# Patient Record
Sex: FEMALE | Race: WHITE | NOT HISPANIC OR LATINO | Employment: UNEMPLOYED | URBAN - METROPOLITAN AREA
[De-identification: names, ages, dates, MRNs, and addresses within clinical notes are randomized per-mention and may not be internally consistent; named-entity substitution may affect disease eponyms.]

---
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
## 2024-04-04 ENCOUNTER — TELEPHONE (OUTPATIENT)
Dept: OBSTETRICS AND GYNECOLOGY | Facility: CLINIC | Age: 19
End: 2024-04-04
Payer: COMMERCIAL

## 2024-04-04 DIAGNOSIS — Z32.00 PREGNANCY EXAMINATION OR TEST, PREGNANCY UNCONFIRMED: Primary | ICD-10-CM

## 2024-04-04 NOTE — TELEPHONE ENCOUNTER
Patient called in on 03/20 with a positive at home pregnancy test. A referral was made but patient has not yet been contacted. LMP was 01-13. I have attached an order for an HCG to be done. Please sign.

## 2024-04-05 ENCOUNTER — LAB (OUTPATIENT)
Dept: OBSTETRICS AND GYNECOLOGY | Facility: CLINIC | Age: 19
End: 2024-04-05
Payer: COMMERCIAL

## 2024-04-05 DIAGNOSIS — Z32.00 PREGNANCY EXAMINATION OR TEST, PREGNANCY UNCONFIRMED: ICD-10-CM

## 2024-04-05 LAB — HCG INTACT+B SERPL-ACNC: NORMAL MIU/ML

## 2024-04-05 PROCEDURE — 84702 CHORIONIC GONADOTROPIN TEST: CPT | Performed by: NURSE PRACTITIONER

## 2024-04-08 ENCOUNTER — TELEPHONE (OUTPATIENT)
Dept: OBSTETRICS AND GYNECOLOGY | Facility: CLINIC | Age: 19
End: 2024-04-08
Payer: COMMERCIAL

## 2024-04-08 ENCOUNTER — TELEPHONE (OUTPATIENT)
Dept: OBSTETRICS AND GYNECOLOGY | Facility: CLINIC | Age: 19
End: 2024-04-08

## 2024-04-08 DIAGNOSIS — O36.80X0 PREGNANCY WITH INCONCLUSIVE FETAL VIABILITY, SINGLE OR UNSPECIFIED FETUS: Primary | ICD-10-CM

## 2024-04-08 NOTE — TELEPHONE ENCOUNTER
Beta HCG is consistent with at least 6-7 weeks gestation.  Please confirm LMP.  Order for ultrasound signed.  Recommend new OB visit in 2-3 weeks, she will need to be between 9-10 weeks to have MaterniT 21 drawn.

## 2024-04-08 NOTE — TELEPHONE ENCOUNTER
Patient called requesting results from HCG on Friday. I have pended an ultrasound order for signature. She is also wanting to know if labs can be scheduled for the Maternit 21 testing. Please advise.

## 2024-04-09 ENCOUNTER — TELEPHONE (OUTPATIENT)
Dept: OBSTETRICS AND GYNECOLOGY | Facility: CLINIC | Age: 19
End: 2024-04-09
Payer: COMMERCIAL

## 2024-04-09 NOTE — TELEPHONE ENCOUNTER
----- Message from SHANE Prescott sent at 4/9/2024  1:10 PM EDT -----  Please let patient know her ultrasound shows a living pregnancy which measures 12w3d, confirming her CLARIBEL of 10/19/24 by her LMP.   Heart rate was 155.  Recommend new OB appointment in 1-2 weeks.

## 2024-04-25 ENCOUNTER — INITIAL PRENATAL (OUTPATIENT)
Dept: OBSTETRICS AND GYNECOLOGY | Facility: CLINIC | Age: 19
End: 2024-04-25
Payer: COMMERCIAL

## 2024-04-25 VITALS — BODY MASS INDEX: 30.12 KG/M2 | SYSTOLIC BLOOD PRESSURE: 117 MMHG | DIASTOLIC BLOOD PRESSURE: 69 MMHG | WEIGHT: 181 LBS

## 2024-04-25 DIAGNOSIS — Z34.91 ENCOUNTER FOR SUPERVISION OF NORMAL PREGNANCY IN FIRST TRIMESTER, UNSPECIFIED GRAVIDITY: Primary | ICD-10-CM

## 2024-04-25 DIAGNOSIS — Z34.00 SUPERVISION OF NORMAL FIRST PREGNANCY, ANTEPARTUM: ICD-10-CM

## 2024-04-25 LAB
ABO GROUP BLD: NORMAL
AMPHET+METHAMPHET UR QL: NEGATIVE
BARBITURATES UR QL SCN: NEGATIVE
BASOPHILS # BLD AUTO: 0.02 10*3/MM3 (ref 0–0.2)
BASOPHILS NFR BLD AUTO: 0.2 % (ref 0–1.5)
BENZODIAZ UR QL SCN: NEGATIVE
BLD GP AB SCN SERPL QL: NEGATIVE
C TRACH RRNA CVX QL NAA+PROBE: DETECTED
CANNABINOIDS SERPL QL: POSITIVE
COCAINE UR QL: NEGATIVE
DEPRECATED RDW RBC AUTO: 53 FL (ref 37–54)
EOSINOPHIL # BLD AUTO: 0.04 10*3/MM3 (ref 0–0.4)
EOSINOPHIL NFR BLD AUTO: 0.5 % (ref 0.3–6.2)
ERYTHROCYTE [DISTWIDTH] IN BLOOD BY AUTOMATED COUNT: 18.3 % (ref 12.3–15.4)
FENTANYL UR-MCNC: NEGATIVE NG/ML
GLUCOSE UR STRIP-MCNC: NEGATIVE MG/DL
HBV SURFACE AG SERPL QL IA: NORMAL
HCT VFR BLD AUTO: 31.6 % (ref 34–46.6)
HCV AB SER QL: NORMAL
HGB BLD-MCNC: 10.2 G/DL (ref 12–15.9)
HIV 1+2 AB+HIV1 P24 AG SERPL QL IA: NORMAL
IMM GRANULOCYTES # BLD AUTO: 0.01 10*3/MM3 (ref 0–0.05)
IMM GRANULOCYTES NFR BLD AUTO: 0.1 % (ref 0–0.5)
LYMPHOCYTES # BLD AUTO: 2.83 10*3/MM3 (ref 0.7–3.1)
LYMPHOCYTES NFR BLD AUTO: 34.6 % (ref 19.6–45.3)
MCH RBC QN AUTO: 25.8 PG (ref 26.6–33)
MCHC RBC AUTO-ENTMCNC: 32.3 G/DL (ref 31.5–35.7)
MCV RBC AUTO: 80 FL (ref 79–97)
METHADONE UR QL SCN: NEGATIVE
MONOCYTES # BLD AUTO: 0.45 10*3/MM3 (ref 0.1–0.9)
MONOCYTES NFR BLD AUTO: 5.5 % (ref 5–12)
N GONORRHOEA RRNA SPEC QL NAA+PROBE: NOT DETECTED
NEUTROPHILS NFR BLD AUTO: 4.84 10*3/MM3 (ref 1.7–7)
NEUTROPHILS NFR BLD AUTO: 59.1 % (ref 42.7–76)
NRBC BLD AUTO-RTO: 0 /100 WBC (ref 0–0.2)
OPIATES UR QL: NEGATIVE
OXYCODONE UR QL SCN: NEGATIVE
PLATELET # BLD AUTO: 214 10*3/MM3 (ref 140–450)
PMV BLD AUTO: 9.8 FL (ref 6–12)
PROT UR STRIP-MCNC: NEGATIVE MG/DL
RBC # BLD AUTO: 3.95 10*6/MM3 (ref 3.77–5.28)
RH BLD: NEGATIVE
T PALLIDUM IGG SER QL: NORMAL
WBC NRBC COR # BLD AUTO: 8.19 10*3/MM3 (ref 3.4–10.8)

## 2024-04-25 PROCEDURE — 86803 HEPATITIS C AB TEST: CPT | Performed by: OBSTETRICS & GYNECOLOGY

## 2024-04-25 PROCEDURE — 80307 DRUG TEST PRSMV CHEM ANLYZR: CPT | Performed by: OBSTETRICS & GYNECOLOGY

## 2024-04-25 PROCEDURE — 86850 RBC ANTIBODY SCREEN: CPT | Performed by: OBSTETRICS & GYNECOLOGY

## 2024-04-25 PROCEDURE — 86901 BLOOD TYPING SEROLOGIC RH(D): CPT | Performed by: OBSTETRICS & GYNECOLOGY

## 2024-04-25 PROCEDURE — 86900 BLOOD TYPING SEROLOGIC ABO: CPT | Performed by: OBSTETRICS & GYNECOLOGY

## 2024-04-25 PROCEDURE — 87086 URINE CULTURE/COLONY COUNT: CPT | Performed by: OBSTETRICS & GYNECOLOGY

## 2024-04-25 PROCEDURE — G0432 EIA HIV-1/HIV-2 SCREEN: HCPCS | Performed by: OBSTETRICS & GYNECOLOGY

## 2024-04-25 PROCEDURE — 86780 TREPONEMA PALLIDUM: CPT | Performed by: OBSTETRICS & GYNECOLOGY

## 2024-04-25 PROCEDURE — 87340 HEPATITIS B SURFACE AG IA: CPT | Performed by: OBSTETRICS & GYNECOLOGY

## 2024-04-25 PROCEDURE — 87591 N.GONORRHOEAE DNA AMP PROB: CPT | Performed by: OBSTETRICS & GYNECOLOGY

## 2024-04-25 PROCEDURE — 86762 RUBELLA ANTIBODY: CPT | Performed by: OBSTETRICS & GYNECOLOGY

## 2024-04-25 PROCEDURE — 87491 CHLMYD TRACH DNA AMP PROBE: CPT | Performed by: OBSTETRICS & GYNECOLOGY

## 2024-04-25 PROCEDURE — 85025 COMPLETE CBC W/AUTO DIFF WBC: CPT | Performed by: OBSTETRICS & GYNECOLOGY

## 2024-04-26 RX ORDER — AZITHROMYCIN 500 MG/1
TABLET, FILM COATED ORAL
Qty: 4 TABLET | Refills: 0 | Status: SHIPPED | OUTPATIENT
Start: 2024-04-26

## 2024-04-26 NOTE — PROGRESS NOTES
OB Initial Visit    CC- Here for care of current pregnancy     CLARIBEL Finalized : Due date finalized: 10/19/24    Subjective:  19 y.o.  presenting for her first obstetrical visit.    LMP: Patient's last menstrual period was 2024 (exact date). sure    Pt has no complaints, is doing well    20 y/o  here for new OB . LMP 24. Menses q mo x 7 days with 3-4 days heavy.  x 1 8lb. Has quit vaping.    Reviewed and updated:  OBHx, GYNHx (STDs), PMHx, Medications, Allergies, PSHx, Social Hx, Preventative Hx (PAP), Hx of abuse/safe environment, Vaccine Hx including hx of chickenpox or vaccine, Genetic Hx (pt, FOB, both families).        Objective:  /69   Wt 82.1 kg (181 lb)   LMP 2024 (Exact Date) Comment: PT STATES SHE IS PREGNANT PER HOMETEST  BMI 30.12 kg/m²  Chaperone Present  General- NAD, alert and oriented, appropriate  Psych- Normal mood, good memory  Neck- No masses, no thyroid enlargement  CV- Regular rhythm, no murnurs  Resp- CTA to bases, no wheezes  Abdomen- Soft, non distended, non tender, no masses     Breast left- No mass, non tender, no nipple discharge  Breast right- No mass, non tender, no nipple discharge    External genitalia- Normal, no lesions  Urethra- Normal, no masses, non tender  Vagina- Normal, no discharge  Bladder- Normal, no masses, non tender  Cvx- Normal, no lesions, no discharge, no CMT  Uterus- Consistent with dates  Adnexa- Normal, no mass, non tender    Lymphatic- No palpable neck, axillary, or groin nodes  Ext- No edema, no cyanosis    Skin- No lesions, no rashes, no acanthosis nigricans      Assessment and Plan:  Diagnoses and all orders for this visit:    1. Encounter for supervision of normal pregnancy in first trimester, unspecified  (Primary)  -     [id number] PLUS Core+SCA+ESS - Blood,    2. Supervision of normal first pregnancy, antepartum  -     OB Panel With HIV  -     Chlamydia trachomatis, Neisseria gonorrhoeae, PCR - Swab, Cervix  -      POC Urinalysis Dipstick  -     Urine Culture - Urine, Urine, Clean Catch  -     Urine Drug Screen - Urine, Clean Catch            14w6d    Genetic Screening: NIPS    Vaccines: s/p FLU vaccine this season  s/p COVID vaccine    Counseling: Nutrition discussed, calories, activity/exercise in pregnancy  Discussed dietary restrictions/safety food preparation in pregnancy  Reviewed what to expect prenatal visits, office providers (female and male) and covering [facility]/Dr. Purdy  Appropriate trimester precautions provided, N/V, vag bleeding, cramping  Questions answered    Labs: Prenatal labs, cultures, and PAP performed (prn), maternity 21    No follow-ups on file.      Jackie Hauser,   04/25/2024

## 2024-04-27 LAB
BACTERIA SPEC AEROBE CULT: NO GROWTH
RUBV IGG SERPL IA-ACNC: 1.94 INDEX

## 2024-05-15 ENCOUNTER — REFERRAL TRIAGE (OUTPATIENT)
Dept: LABOR AND DELIVERY | Facility: HOSPITAL | Age: 19
End: 2024-05-15
Payer: COMMERCIAL

## 2024-05-22 ENCOUNTER — PATIENT OUTREACH (OUTPATIENT)
Dept: LABOR AND DELIVERY | Facility: HOSPITAL | Age: 19
End: 2024-05-22
Payer: COMMERCIAL

## 2024-05-22 ENCOUNTER — ROUTINE PRENATAL (OUTPATIENT)
Dept: OBSTETRICS AND GYNECOLOGY | Facility: CLINIC | Age: 19
End: 2024-05-22
Payer: COMMERCIAL

## 2024-05-22 VITALS — BODY MASS INDEX: 29.79 KG/M2 | DIASTOLIC BLOOD PRESSURE: 71 MMHG | WEIGHT: 179 LBS | SYSTOLIC BLOOD PRESSURE: 122 MMHG

## 2024-05-22 DIAGNOSIS — Z34.00 SUPERVISION OF NORMAL FIRST PREGNANCY, ANTEPARTUM: Primary | ICD-10-CM

## 2024-05-22 DIAGNOSIS — O26.893 RH NEGATIVE STATUS DURING PREGNANCY IN THIRD TRIMESTER: ICD-10-CM

## 2024-05-22 DIAGNOSIS — Z67.91 RH NEGATIVE STATUS DURING PREGNANCY IN THIRD TRIMESTER: ICD-10-CM

## 2024-05-22 DIAGNOSIS — Z28.39 RUBELLA NON-IMMUNE STATUS, ANTEPARTUM: ICD-10-CM

## 2024-05-22 DIAGNOSIS — O09.899 RUBELLA NON-IMMUNE STATUS, ANTEPARTUM: ICD-10-CM

## 2024-05-22 DIAGNOSIS — Z34.80 SUPERVISION OF OTHER NORMAL PREGNANCY, ANTEPARTUM: ICD-10-CM

## 2024-05-22 DIAGNOSIS — O99.519 ASTHMA AFFECTING PREGNANCY, ANTEPARTUM: ICD-10-CM

## 2024-05-22 DIAGNOSIS — J45.909 ASTHMA AFFECTING PREGNANCY, ANTEPARTUM: ICD-10-CM

## 2024-05-22 PROBLEM — E55.9 VITAMIN D DEFICIENCY: Status: RESOLVED | Noted: 2023-02-02 | Resolved: 2024-05-22

## 2024-05-22 PROBLEM — O13.3 GESTATIONAL HYPERTENSION, THIRD TRIMESTER: Status: RESOLVED | Noted: 2021-12-06 | Resolved: 2024-05-22

## 2024-05-22 PROBLEM — E61.1 IRON DEFICIENCY: Status: RESOLVED | Noted: 2023-02-02 | Resolved: 2024-05-22

## 2024-05-22 PROBLEM — K21.00 GASTROESOPHAGEAL REFLUX DISEASE WITH ESOPHAGITIS WITHOUT HEMORRHAGE: Status: RESOLVED | Noted: 2021-11-28 | Resolved: 2024-05-22

## 2024-05-22 LAB
C TRACH RRNA CVX QL NAA+PROBE: NOT DETECTED
GLUCOSE UR STRIP-MCNC: NEGATIVE MG/DL
N GONORRHOEA RRNA SPEC QL NAA+PROBE: NOT DETECTED
PROT UR STRIP-MCNC: NEGATIVE MG/DL

## 2024-05-22 PROCEDURE — 87591 N.GONORRHOEAE DNA AMP PROB: CPT | Performed by: OBSTETRICS & GYNECOLOGY

## 2024-05-22 PROCEDURE — 87491 CHLMYD TRACH DNA AMP PROBE: CPT | Performed by: OBSTETRICS & GYNECOLOGY

## 2024-05-22 NOTE — PROGRESS NOTES
[facility]  OB Follow Up Visit    CC: Routine obstetrical visit    Prenatal care complicated by:  Patient Active Problem List   Diagnosis    Supervision of other normal pregnancy, antepartum    Mild intermittent asthma without complication    Rh negative status during pregnancy in third trimester    Rubella non-immune status, antepartum    Asthma affecting pregnancy, antepartum     Subjective:   Jazmin Mae Heimlich is a 19 y.o.  18w4d patient being seen today for her obstetrical follow up visit. The patient has: No complaints, No leaking fluid, No vaginal bleeding, No contractions, normal fetal movements    History: Past medical and surgical history, medications, allergies, social history, and obstetrical history all reviewed and updated.    Objective:    Urine glucose/protein - See OB flow sheet      /71   Wt 81.2 kg (179 lb)   LMP 2024 (Exact Date) Comment: PT STATES SHE IS PREGNANT PER HOMETEST  BMI 29.79 kg/m²     General exam: Comfortable, NAD  FHR: 158 BPM   Uterine Size: size equals dates  Pelvic Exam: Yes.  Presentation: unsure. Dilation: Closed. Effacement: Long. Station: -3.  Sterile speculum exam: Normal external genitalia, vagina, and grossly normal-appearing pregnant cervix.  No significant discharge or odor noted.  Chlamydia test of cure collected    Assessment and Plan:  Diagnoses and all orders for this visit:    1. Supervision of normal first pregnancy, antepartum (Primary)  -     POC Urinalysis Dipstick  -     Chlamydia trachomatis, Neisseria gonorrhoeae, PCR - Swab, Cervix  -     US Ob Detail Fetal Anatomy Single or First Gestation; Future    2. Supervision of other normal pregnancy, antepartum  Overview:  EDC 3/9/22    Asthma  Abnormal cardiac views on ultrasound -status post [facility] consultation with normal cardiac anatomy.  Status post laparoscopic cholecystectomy during pregnancy    COVID vaccine - Reccomended  Tdap vaccine - 2021 counseled again to  getting Tdap 12/30/2021   Flu vaccine    Assessment & Plan:  Continue prenatal vitamin  Schedule anatomy ultrasound      3. Rubella non-immune status, antepartum  Overview:  1/14/2022 MMR PP      4. Rh negative status during pregnancy in third trimester  Overview:  RhoGAM at 28 weeks      5. Asthma affecting pregnancy, antepartum  Assessment & Plan:  Stable.  Continue albuterol MDI as needed        18w4d  Reassuring pregnancy progress    Counseling: Second trimester precautions  OB precautions, leaking, VB, dov willis vs PTL/Labor    Questions answered    Return in about 4 weeks (around 6/19/2024).    Chuck Hidalgo MD  05/22/2024

## 2024-05-22 NOTE — OUTREACH NOTE
[facility]  Enrollment    Current Estimated Gestational Age: 18w4d    Questions/Answers      Flowsheet Row Responses   Would like to participate? Yes          Intake Assessment      Flowsheet Row Responses   Best Method for Contacting Cell   Currently Employed Yes   Able to keep appointments as scheduled Yes   Gender(s) and Name(s) Girl   Resources Presently Utilizing: WIC (Women, Infant, Children)   Maternal Warning Signs Provided   Other: Provided   Other Education WIC Benefits, Insurance benefits/Incentives, HANDS            Learning Assessment      Flowsheet Row Responses   Relationship Patient   Does the learner have any barriers to learning? No Barriers   What is the preferred language of the learner for medical teaching? English   Is an  required? No            Pediatrician:   JAKE Horta  Feeding Plan: breast feeding    No current concerns with food, housing or transportation.  Encouraged to reach out for any questions, needs or concerns.    Tobacco, Alcohol, and Drug History     reports that she has never smoked. She has been exposed to tobacco smoke. She has never used smokeless tobacco.   reports no history of alcohol use.   reports no history of drug use.    Shari Ayala RN  Maternity Nurse Navigator    5/22/2024, 12:56 EDT

## 2024-06-19 ENCOUNTER — ROUTINE PRENATAL (OUTPATIENT)
Dept: OBSTETRICS AND GYNECOLOGY | Facility: CLINIC | Age: 19
End: 2024-06-19
Payer: COMMERCIAL

## 2024-06-19 VITALS — WEIGHT: 183 LBS | DIASTOLIC BLOOD PRESSURE: 73 MMHG | BODY MASS INDEX: 30.45 KG/M2 | SYSTOLIC BLOOD PRESSURE: 119 MMHG

## 2024-06-19 DIAGNOSIS — O26.893 RH NEGATIVE STATUS DURING PREGNANCY IN THIRD TRIMESTER: ICD-10-CM

## 2024-06-19 DIAGNOSIS — Z28.39 RUBELLA NON-IMMUNE STATUS, ANTEPARTUM: ICD-10-CM

## 2024-06-19 DIAGNOSIS — O09.899 RUBELLA NON-IMMUNE STATUS, ANTEPARTUM: ICD-10-CM

## 2024-06-19 DIAGNOSIS — Z67.91 RH NEGATIVE STATUS DURING PREGNANCY IN THIRD TRIMESTER: ICD-10-CM

## 2024-06-19 DIAGNOSIS — J45.909 ASTHMA AFFECTING PREGNANCY, ANTEPARTUM: ICD-10-CM

## 2024-06-19 DIAGNOSIS — O26.812 PREGNANCY RELATED FATIGUE IN SECOND TRIMESTER: ICD-10-CM

## 2024-06-19 DIAGNOSIS — M79.10 MYALGIA: ICD-10-CM

## 2024-06-19 DIAGNOSIS — O99.519 ASTHMA AFFECTING PREGNANCY, ANTEPARTUM: ICD-10-CM

## 2024-06-19 DIAGNOSIS — Z34.00 SUPERVISION OF NORMAL FIRST PREGNANCY, ANTEPARTUM: ICD-10-CM

## 2024-06-19 DIAGNOSIS — Z34.80 SUPERVISION OF OTHER NORMAL PREGNANCY, ANTEPARTUM: Primary | ICD-10-CM

## 2024-06-19 LAB
DEPRECATED RDW RBC AUTO: 50.1 FL (ref 37–54)
ERYTHROCYTE [DISTWIDTH] IN BLOOD BY AUTOMATED COUNT: 15.9 % (ref 12.3–15.4)
FERRITIN SERPL-MCNC: 13.8 NG/ML (ref 13–150)
GLUCOSE UR STRIP-MCNC: NEGATIVE MG/DL
HCT VFR BLD AUTO: 32.7 % (ref 34–46.6)
HGB BLD-MCNC: 10.6 G/DL (ref 12–15.9)
MCH RBC QN AUTO: 28.3 PG (ref 26.6–33)
MCHC RBC AUTO-ENTMCNC: 32.4 G/DL (ref 31.5–35.7)
MCV RBC AUTO: 87.2 FL (ref 79–97)
PLATELET # BLD AUTO: 195 10*3/MM3 (ref 140–450)
PMV BLD AUTO: 10.5 FL (ref 6–12)
PROT UR STRIP-MCNC: NEGATIVE MG/DL
RBC # BLD AUTO: 3.75 10*6/MM3 (ref 3.77–5.28)
RETICS # AUTO: 0.1 10*6/MM3 (ref 0.02–0.13)
RETICS/RBC NFR AUTO: 2.61 % (ref 0.7–1.9)
WBC NRBC COR # BLD AUTO: 8.29 10*3/MM3 (ref 3.4–10.8)

## 2024-06-19 PROCEDURE — 85045 AUTOMATED RETICULOCYTE COUNT: CPT | Performed by: OBSTETRICS & GYNECOLOGY

## 2024-06-19 PROCEDURE — 80053 COMPREHEN METABOLIC PANEL: CPT | Performed by: OBSTETRICS & GYNECOLOGY

## 2024-06-19 PROCEDURE — 84466 ASSAY OF TRANSFERRIN: CPT | Performed by: OBSTETRICS & GYNECOLOGY

## 2024-06-19 PROCEDURE — 83540 ASSAY OF IRON: CPT | Performed by: OBSTETRICS & GYNECOLOGY

## 2024-06-19 PROCEDURE — 82728 ASSAY OF FERRITIN: CPT | Performed by: OBSTETRICS & GYNECOLOGY

## 2024-06-19 PROCEDURE — 85027 COMPLETE CBC AUTOMATED: CPT | Performed by: OBSTETRICS & GYNECOLOGY

## 2024-06-19 NOTE — PROGRESS NOTES
[facility]  OB Follow Up Visit    CC: Routine obstetrical visit    Prenatal care complicated by:  Patient Active Problem List   Diagnosis    Supervision of other normal pregnancy, antepartum    Mild intermittent asthma without complication    Rh negative status during pregnancy in third trimester    Rubella non-immune status, antepartum    Asthma affecting pregnancy, antepartum     Subjective:   Jazmin Mae Heimlich is a 19 y.o.  22w4d patient being seen today for her obstetrical follow up visit. The patient has:  No leaking fluid, No vaginal bleeding, No contractions, Adequate FM  Complains of lower extremity cramps at night.  Much worse on the right side than the left.  Concerned about anemia.  Having some fatigue.    History: Past medical and surgical history, medications, allergies, social history, and obstetrical history all reviewed and updated.    Objective:    Urine glucose/protein - See OB flow sheet      /73   Wt 83 kg (183 lb)   LMP 2024 (Exact Date) Comment: PT STATES SHE IS PREGNANT PER HOMETEST  BMI 30.45 kg/m²     General exam: Comfortable, NAD  FHR: 139 BPM   Uterine Size:  22 cm  Pelvic Exam: No    Assessment and Plan:  Diagnoses and all orders for this visit:    1. Supervision of other normal pregnancy, antepartum (Primary)  Overview:  EDC 3/9/22    Asthma  Abnormal cardiac views on ultrasound -status post MFM consultation with normal cardiac anatomy.  Status post laparoscopic cholecystectomy during pregnancy    COVID vaccine - Reccomended  Tdap vaccine - 2021 counseled again to getting Tdap 2021   Flu vaccine    Assessment & Plan:  Reviewed today's anatomy ultrasound the anatomy is normal.  Placenta is anterior.  Normal THONY.  Continue prenatal vitamin  1 hour GTT next office visit    Orders:  -     POC Urinalysis Dipstick    2. Supervision of normal first pregnancy, antepartum    3. Myalgia  -     Comprehensive Metabolic Panel  -     CBC (No  Diff)    4. Pregnancy related fatigue in second trimester  -     Ferritin  -     Iron Profile  -     Reticulocytes    5. Asthma affecting pregnancy, antepartum  Assessment & Plan:  Stable.  Continue albuterol MDI as needed      6. Rh negative status during pregnancy in third trimester  Overview:  RhoGAM at 28 weeks      7. Rubella non-immune status, antepartum  Overview:  1/14/2022 MMR PP        22w4d  Reassuring pregnancy progress    Counseling: Second trimester precautions  OB precautions, leaking, VB, dvo willis vs PTL/Labor    Questions answered    Return in about 4 weeks (around 7/17/2024) for Recheck.    Chuck Hidalgo MD  06/19/2024

## 2024-06-19 NOTE — ASSESSMENT & PLAN NOTE
Reviewed today's anatomy ultrasound the anatomy is normal.  Placenta is anterior.  Normal THONY.  Continue prenatal vitamin  1 hour GTT next office visit

## 2024-06-20 ENCOUNTER — TELEPHONE (OUTPATIENT)
Dept: OBSTETRICS AND GYNECOLOGY | Facility: CLINIC | Age: 19
End: 2024-06-20
Payer: COMMERCIAL

## 2024-06-20 DIAGNOSIS — O99.019 MATERNAL ANEMIA IN PREGNANCY, ANTEPARTUM: Primary | ICD-10-CM

## 2024-06-20 LAB
ALBUMIN SERPL-MCNC: 3.7 G/DL (ref 3.5–5.2)
ALBUMIN/GLOB SERPL: 1.6 G/DL
ALP SERPL-CCNC: 50 U/L (ref 39–117)
ALT SERPL W P-5'-P-CCNC: 14 U/L (ref 1–33)
ANION GAP SERPL CALCULATED.3IONS-SCNC: 8.5 MMOL/L (ref 5–15)
AST SERPL-CCNC: 23 U/L (ref 1–32)
BILIRUB SERPL-MCNC: <0.2 MG/DL (ref 0–1.2)
BUN SERPL-MCNC: 5 MG/DL (ref 6–20)
BUN/CREAT SERPL: 9.4 (ref 7–25)
CALCIUM SPEC-SCNC: 8.6 MG/DL (ref 8.6–10.5)
CHLORIDE SERPL-SCNC: 107 MMOL/L (ref 98–107)
CO2 SERPL-SCNC: 20.5 MMOL/L (ref 22–29)
CREAT SERPL-MCNC: 0.53 MG/DL (ref 0.57–1)
EGFRCR SERPLBLD CKD-EPI 2021: 136 ML/MIN/1.73
GLOBULIN UR ELPH-MCNC: 2.3 GM/DL
GLUCOSE SERPL-MCNC: 72 MG/DL (ref 65–99)
IRON 24H UR-MRATE: 107 MCG/DL (ref 37–145)
IRON SATN MFR SERPL: 18 % (ref 20–50)
POTASSIUM SERPL-SCNC: 4.2 MMOL/L (ref 3.5–5.2)
PROT SERPL-MCNC: 6 G/DL (ref 6–8.5)
SODIUM SERPL-SCNC: 136 MMOL/L (ref 136–145)
TIBC SERPL-MCNC: 581 MCG/DL (ref 298–536)
TRANSFERRIN SERPL-MCNC: 390 MG/DL (ref 200–360)

## 2024-06-20 RX ORDER — FERROUS SULFATE 325(65) MG
325 TABLET ORAL EVERY OTHER DAY
Qty: 30 TABLET | Refills: 3 | Status: SHIPPED | OUTPATIENT
Start: 2024-06-20

## 2024-06-20 NOTE — TELEPHONE ENCOUNTER
Patient was advised of her results but had never received a script previously for iron supplementation. Please sign the attached script.

## 2024-06-25 ENCOUNTER — PATIENT OUTREACH (OUTPATIENT)
Dept: LABOR AND DELIVERY | Facility: HOSPITAL | Age: 19
End: 2024-06-25
Payer: COMMERCIAL

## 2024-06-25 NOTE — OUTREACH NOTE
[facility]  Check-In    Current Estimated Gestational Age: 23w3d      MyChart second trimester information sent.      Shari Ayala RN  Maternity Nurse Navigator    6/25/2024, 15:25 EDT

## 2024-07-29 ENCOUNTER — ROUTINE PRENATAL (OUTPATIENT)
Dept: OBSTETRICS AND GYNECOLOGY | Facility: CLINIC | Age: 19
End: 2024-07-29
Payer: COMMERCIAL

## 2024-07-29 VITALS — WEIGHT: 184.6 LBS | DIASTOLIC BLOOD PRESSURE: 71 MMHG | SYSTOLIC BLOOD PRESSURE: 122 MMHG | BODY MASS INDEX: 30.72 KG/M2

## 2024-07-29 DIAGNOSIS — Z34.00 SUPERVISION OF NORMAL FIRST PREGNANCY, ANTEPARTUM: ICD-10-CM

## 2024-07-29 DIAGNOSIS — O26.893 RH NEGATIVE STATUS DURING PREGNANCY IN THIRD TRIMESTER: ICD-10-CM

## 2024-07-29 DIAGNOSIS — Z67.91 RH NEGATIVE STATUS DURING PREGNANCY IN THIRD TRIMESTER: ICD-10-CM

## 2024-07-29 LAB
ABO GROUP BLD: NORMAL
BLD GP AB SCN SERPL QL: NEGATIVE
DEPRECATED RDW RBC AUTO: 47.1 FL (ref 37–54)
ERYTHROCYTE [DISTWIDTH] IN BLOOD BY AUTOMATED COUNT: 14.4 % (ref 12.3–15.4)
GLUCOSE 1H P GLC SERPL-MCNC: 101 MG/DL (ref 65–139)
GLUCOSE UR STRIP-MCNC: NEGATIVE MG/DL
HCT VFR BLD AUTO: 33.4 % (ref 34–46.6)
HGB BLD-MCNC: 10.9 G/DL (ref 12–15.9)
MCH RBC QN AUTO: 29.4 PG (ref 26.6–33)
MCHC RBC AUTO-ENTMCNC: 32.6 G/DL (ref 31.5–35.7)
MCV RBC AUTO: 90 FL (ref 79–97)
PLATELET # BLD AUTO: 201 10*3/MM3 (ref 140–450)
PMV BLD AUTO: 9.9 FL (ref 6–12)
PROT UR STRIP-MCNC: NEGATIVE MG/DL
RBC # BLD AUTO: 3.71 10*6/MM3 (ref 3.77–5.28)
RH BLD: NEGATIVE
WBC NRBC COR # BLD AUTO: 8.33 10*3/MM3 (ref 3.4–10.8)

## 2024-07-29 PROCEDURE — 85027 COMPLETE CBC AUTOMATED: CPT | Performed by: OBSTETRICS & GYNECOLOGY

## 2024-07-29 PROCEDURE — 86901 BLOOD TYPING SEROLOGIC RH(D): CPT | Performed by: OBSTETRICS & GYNECOLOGY

## 2024-07-29 PROCEDURE — 86900 BLOOD TYPING SEROLOGIC ABO: CPT | Performed by: OBSTETRICS & GYNECOLOGY

## 2024-07-29 PROCEDURE — 82950 GLUCOSE TEST: CPT | Performed by: OBSTETRICS & GYNECOLOGY

## 2024-07-29 PROCEDURE — 86850 RBC ANTIBODY SCREEN: CPT | Performed by: OBSTETRICS & GYNECOLOGY

## 2024-07-29 NOTE — PROGRESS NOTES
OB FOLLOW UP  CC- Here for care of pregnancy        Jazmin Mae Heimlich is a 19 y.o.  28w2d patient being seen today for her obstetrical follow up visit. Patient reports no complaints.     Her prenatal care is complicated by (and status) :    Patient Active Problem List   Diagnosis    Supervision of other normal pregnancy, antepartum    Mild intermittent asthma without complication    Rh negative status during pregnancy in third trimester    Rubella non-immune status, antepartum    Asthma affecting pregnancy, antepartum       Flu Status:   Covid Status:    ROS -   Patient Reports : No Problems  Patient Denies: Loss of Fluid and Vaginal Spotting  Fetal Movement : normal  All other systems reviewed and are negative.       The additional following portions of the patient's history were reviewed and updated as appropriate: allergies, current medications, past family history, past medical history, past social history, past surgical history, and problem list.    I have reviewed and agree with the HPI, ROS, and historical information as entered above. Jackie Hauser, DO    /71   Wt 83.7 kg (184 lb 9.6 oz)   LMP 2024 (Exact Date) Comment: PT STATES SHE IS PREGNANT PER HOMETEST  BMI 30.72 kg/m²       EXAM:     FHT: 147 BPM   Uterine Size: size equals dates  Pelvic Exam: No    Urine glucose/protein: See prenatal flowsheet       Assessment and Plan  Diagnoses and all orders for this visit:    1. Rh negative status during pregnancy in third trimester  Overview:  RhoGAM at 28 weeks    Orders:  -      RhIg Evaluation  -     Doses of rh immune globulin  -     Rhogam Immune Globulin Immunization    2. Supervision of normal first pregnancy, antepartum  -     POC Urinalysis Dipstick  -     CBC (No Diff)  -     Gestational Diabetes Screen 1 Hour         Pregnancy at 28w2d  Fetal status reassuring.   Activity and Exercise discussed.  Return in about 2 weeks (around 2024) for rotate providers.  Kiliya  counts twice daily  Tdap Rx given  RhoGAM today    Jackie Hauser, DO  07/29/2024

## 2024-07-30 LAB — NUMBER OF DOSES: NORMAL

## 2024-08-06 RX ORDER — FERROUS SULFATE 325(65) MG
1 TABLET, DELAYED RELEASE (ENTERIC COATED) ORAL
COMMUNITY
Start: 2024-07-13 | End: 2024-08-06 | Stop reason: SDUPTHER

## 2024-08-11 PROBLEM — Z87.59 HISTORY OF GESTATIONAL HYPERTENSION: Status: RESOLVED | Noted: 2021-12-06 | Resolved: 2024-05-22

## 2024-08-11 PROBLEM — Z87.59 HISTORY OF GESTATIONAL HYPERTENSION: Status: ACTIVE | Noted: 2021-12-06

## 2024-08-11 NOTE — PROGRESS NOTES
Routine Prenatal Visit     Subjective  Jazmin Mae Heimlich is a 19 y.o.  at 30w2d here for her routine OB visit.   She is taking her prenatal vitamins.Reports no loss of fluid or vaginal bleeding. Patient doing well without any complaints. Pregnancy complicated by:     Patient Active Problem List   Diagnosis    Supervision of other normal pregnancy, antepartum    History of gestational hypertension    Rh negative status during pregnancy in third trimester    Rubella non-immune status, antepartum    Asthma affecting pregnancy, antepartum    Anemia    Chlamydia infection during pregnancy         OB History    Para Term  AB Living   5 1 1   3 1   SAB IAB Ectopic Molar Multiple Live Births   2 1     0 1      # Outcome Date GA Lbr Jignesh/2nd Weight Sex Type Anes PTL Lv   5 Current            4 SAB 23           3 IAB 2023           2 Term 22 40w0d 14:32 / 00:13 3640 g (8 lb 0.4 oz) M Vag-Spont EPI N ZABRINA   1 SAB                    ROS:   General ROS: negative for - chills or fatigue  Respiratory ROS: negative for - cough or hemoptysis  Cardiovascular ROS: negative for - chest pain or dyspnea on exertion  Genito-Urinary ROS: negative for  change in urinary stream, vaginal discharge   Musculoskeletal ROS: negative for - gait disturbance or joint pain  Dermatological ROS: negative for acne,  dry skin or itching    Objective  Physical Exam:   Vitals:    24 0908   BP: 122/76       Uterine Size: size equals dates  FHT: 110-160 BPM    General appearance - alert, well appearing, and in no distress  Mental status - alert, oriented to person, place, and time  Abdomen- Soft, Gravid uterus, non-tender to palpation  Musculoskeletal: negative for - gait disturbance or joint pain  Extremeties: negative swelling or cyanosis   Dermatological: negative rashes or skin lesions       Assessment/Plan:   Diagnoses and all orders for this visit:    1. Supervision of normal first pregnancy, antepartum  (Primary)  -     POC Urinalysis Dipstick  -     T Pallidum Antibody w/ reflex RPR (Syphilis)    2. Rh negative status during pregnancy in third trimester  Comments:  Recieved rhogam 7/29/24    3. Maternal anemia in pregnancy, antepartum  Comments:  Continue iron supplementation    4. Rubella non-immune status, antepartum  Comments:  MMR pp    5. Asthma affecting pregnancy, antepartum  Comments:  Stable, albuterol PRN    6. Supervision of other normal pregnancy, antepartum    7. History of gestational hypertension    8. Chlamydia infection during pregnancy  Comments:  HARPREET negative, repeat at 36 weeks            Counseling:   OB precautions, leaking, VB, dov willis vs PTL/Labor  FKC  HTN precautions reviewed: HA, vision change, RUQ/epigastric pain, edema  Round Ligament Pain:  The uterus has several ligaments which provide support and keep the uterus in place. As the  uterus grows these ligaments are pulled and stretched which often causes sharp stabbing like pain in the inguinal area.   You may find a pregnancy support band helpful. Changing positions may also help. Yoga is a great way to cope with round ligament and low back pain in pregnancy.    Massage may also help with low back pain   Things to Consider at this Point in your Pregnancy:  Some women experience swelling in their feet during pregnancy. Compression stockings may help  Drink plenty of water and stay active   Make sure you are eating frequent small meals, nuts are a wonderful snack to keep with you            Return in about 2 weeks (around 8/26/2024) for Routine OB visit.      We have gone over prenatal care to include the timing and content of visits. I informed her how to contact the office and/or on call person in the event of any problems and encouraged her to do so when she feels it is necessary.  We then spent time answering her questions which she indicated were answered to her satisfaction.    Carol Hoover DO  8/12/2024 09:22 EDT

## 2024-08-12 ENCOUNTER — ROUTINE PRENATAL (OUTPATIENT)
Dept: OBSTETRICS AND GYNECOLOGY | Facility: CLINIC | Age: 19
End: 2024-08-12
Payer: COMMERCIAL

## 2024-08-12 VITALS — DIASTOLIC BLOOD PRESSURE: 76 MMHG | SYSTOLIC BLOOD PRESSURE: 122 MMHG | WEIGHT: 187 LBS | BODY MASS INDEX: 31.12 KG/M2

## 2024-08-12 DIAGNOSIS — Z87.59 HISTORY OF GESTATIONAL HYPERTENSION: ICD-10-CM

## 2024-08-12 DIAGNOSIS — O99.019 MATERNAL ANEMIA IN PREGNANCY, ANTEPARTUM: ICD-10-CM

## 2024-08-12 DIAGNOSIS — Z67.91 RH NEGATIVE STATUS DURING PREGNANCY IN THIRD TRIMESTER: ICD-10-CM

## 2024-08-12 DIAGNOSIS — A74.9 CHLAMYDIA INFECTION DURING PREGNANCY: ICD-10-CM

## 2024-08-12 DIAGNOSIS — Z34.80 SUPERVISION OF OTHER NORMAL PREGNANCY, ANTEPARTUM: ICD-10-CM

## 2024-08-12 DIAGNOSIS — O99.519 ASTHMA AFFECTING PREGNANCY, ANTEPARTUM: ICD-10-CM

## 2024-08-12 DIAGNOSIS — Z34.00 SUPERVISION OF NORMAL FIRST PREGNANCY, ANTEPARTUM: Primary | ICD-10-CM

## 2024-08-12 DIAGNOSIS — Z28.39 RUBELLA NON-IMMUNE STATUS, ANTEPARTUM: ICD-10-CM

## 2024-08-12 DIAGNOSIS — O26.893 RH NEGATIVE STATUS DURING PREGNANCY IN THIRD TRIMESTER: ICD-10-CM

## 2024-08-12 DIAGNOSIS — O98.819 CHLAMYDIA INFECTION DURING PREGNANCY: ICD-10-CM

## 2024-08-12 DIAGNOSIS — J45.909 ASTHMA AFFECTING PREGNANCY, ANTEPARTUM: ICD-10-CM

## 2024-08-12 DIAGNOSIS — O09.899 RUBELLA NON-IMMUNE STATUS, ANTEPARTUM: ICD-10-CM

## 2024-08-12 LAB
GLUCOSE UR STRIP-MCNC: NEGATIVE MG/DL
PROT UR STRIP-MCNC: NEGATIVE MG/DL

## 2024-08-12 PROCEDURE — 99213 OFFICE O/P EST LOW 20 MIN: CPT | Performed by: OBSTETRICS & GYNECOLOGY

## 2024-08-26 NOTE — PROGRESS NOTES
Routine Prenatal Visit     Subjective  Jazmin Mae Heimlich is a 19 y.o.  at 32w3d here for her routine OB visit.   She is taking her prenatal vitamins.Reports no loss of fluid or vaginal bleeding. Patient doing ok. States she has noticed decreased FM for the last few days. Pregnancy complicated by:     Patient Active Problem List   Diagnosis    Supervision of other normal pregnancy, antepartum    History of gestational hypertension    Rh negative status during pregnancy in third trimester    Rubella non-immune status, antepartum    Asthma affecting pregnancy, antepartum    Anemia    Chlamydia infection during pregnancy         OB History    Para Term  AB Living   5 1 1   3 1   SAB IAB Ectopic Molar Multiple Live Births   2 1     0 1      # Outcome Date GA Lbr Jignesh/2nd Weight Sex Type Anes PTL Lv   5 Current            4 SAB 23           3 IAB 2023           2 Term 22 40w0d 14:32 / 00:13 3640 g (8 lb 0.4 oz) M Vag-Spont EPI N ZABRINA   1 SAB                    ROS:   General ROS: negative for - chills or fatigue  Genito-Urinary ROS: negative for  change in urinary stream, vaginal discharge     Objective  Physical Exam:   Vitals:    24 0926   BP: 128/79       Uterine Size: size equals dates  FHT: 110-160 BPM    General appearance - alert, well appearing, and in no distress  Abdomen- Soft, Gravid uterus, non-tender to palpation  Extremeties: negative swelling       Assessment/Plan:   Diagnoses and all orders for this visit:    1. Supervision of other normal pregnancy, antepartum (Primary)  Assessment & Plan:  EDC 3/9/22     Asthma  Abnormal cardiac views on ultrasound -status post [facility] consultation with normal cardiac anatomy.  Status post laparoscopic cholecystectomy during pregnancy     COVID vaccine - Reccomended  Tdap vaccine - 2021 counseled again to getting Tdap 2021   Flu vaccine       Orders:  -     POC Urinalysis Dipstick  -     Prenatal Vit-Fe Fumarate-FA  (Prenatal 27-1) 27-1 MG tablet tablet; Take 1 tablet by mouth Daily.  Dispense: 90 tablet; Refill: 1    2. Rh negative status during pregnancy in third trimester    3. Rubella non-immune status, antepartum    4. Asthma affecting pregnancy, antepartum    5. Decreased fetal movements in third trimester, single or unspecified fetus            Counseling:   OB precautions, leaking, VB, dov willis vs PTL/Labor  FKC  SENT TO L+D for NST due to decreased FM.  L+D charge nurse and covering OB/GYN hospitalist notified.  DW pt, questions answered.  Round Ligament Pain:  The uterus has several ligaments which provide support and keep the uterus in place. As the  uterus grows these ligaments are pulled and stretched which often causes sharp stabbing like pain in the inguinal area.   You may find a pregnancy support band helpful. Changing positions may also help. Yoga is a great way to cope with round ligament and low back pain in pregnancy.    Massage may also help with low back pain   Things to Consider at this Point in your Pregnancy:  Some women experience swelling in their feet during pregnancy. Compression stockings may help  Drink plenty of water and stay active   Make sure you are eating frequent small meals, nuts are a wonderful snack to keep with you            Return in about 2 weeks (around 9/10/2024) for Routine OB visit.      We have gone over prenatal care to include the timing and content of visits. I informed her how to contact the office and/or on call person in the event of any problems and encouraged her to do so when she feels it is necessary.  We then spent time answering her questions which she indicated were answered to her satisfaction.    Ria Bernal DO  8/27/2024 09:57 EDT

## 2024-08-27 ENCOUNTER — ROUTINE PRENATAL (OUTPATIENT)
Dept: OBSTETRICS AND GYNECOLOGY | Facility: CLINIC | Age: 19
End: 2024-08-27
Payer: COMMERCIAL

## 2024-08-27 ENCOUNTER — HOSPITAL ENCOUNTER (OUTPATIENT)
Facility: HOSPITAL | Age: 19
Discharge: HOME | End: 2024-08-27
Attending: OBSTETRICS & GYNECOLOGY | Admitting: STUDENT IN AN ORGANIZED HEALTH CARE EDUCATION/TRAINING PROGRAM
Payer: COMMERCIAL

## 2024-08-27 VITALS — WEIGHT: 189 LBS | SYSTOLIC BLOOD PRESSURE: 128 MMHG | BODY MASS INDEX: 31.45 KG/M2 | DIASTOLIC BLOOD PRESSURE: 79 MMHG

## 2024-08-27 VITALS — OXYGEN SATURATION: 99 % | SYSTOLIC BLOOD PRESSURE: 100 MMHG | HEART RATE: 101 BPM | DIASTOLIC BLOOD PRESSURE: 63 MMHG

## 2024-08-27 DIAGNOSIS — Z28.39 RUBELLA NON-IMMUNE STATUS, ANTEPARTUM: ICD-10-CM

## 2024-08-27 DIAGNOSIS — J45.909 ASTHMA AFFECTING PREGNANCY, ANTEPARTUM: ICD-10-CM

## 2024-08-27 DIAGNOSIS — O26.893 RH NEGATIVE STATUS DURING PREGNANCY IN THIRD TRIMESTER: ICD-10-CM

## 2024-08-27 DIAGNOSIS — Z34.80 SUPERVISION OF OTHER NORMAL PREGNANCY, ANTEPARTUM: Primary | ICD-10-CM

## 2024-08-27 DIAGNOSIS — Z67.91 RH NEGATIVE STATUS DURING PREGNANCY IN THIRD TRIMESTER: ICD-10-CM

## 2024-08-27 DIAGNOSIS — O99.519 ASTHMA AFFECTING PREGNANCY, ANTEPARTUM: ICD-10-CM

## 2024-08-27 DIAGNOSIS — O36.8130 DECREASED FETAL MOVEMENTS IN THIRD TRIMESTER, SINGLE OR UNSPECIFIED FETUS: ICD-10-CM

## 2024-08-27 DIAGNOSIS — O09.899 RUBELLA NON-IMMUNE STATUS, ANTEPARTUM: ICD-10-CM

## 2024-08-27 LAB
GLUCOSE UR STRIP-MCNC: NEGATIVE MG/DL
PROT UR STRIP-MCNC: NEGATIVE MG/DL
TREPONEMA PALLIDUM IGG+IGM AB [PRESENCE] IN SERUM OR PLASMA BY IMMUNOASSAY: NORMAL

## 2024-08-27 PROCEDURE — 99213 OFFICE O/P EST LOW 20 MIN: CPT | Performed by: STUDENT IN AN ORGANIZED HEALTH CARE EDUCATION/TRAINING PROGRAM

## 2024-08-27 PROCEDURE — 59025 FETAL NON-STRESS TEST: CPT

## 2024-08-27 PROCEDURE — 86780 TREPONEMA PALLIDUM: CPT | Performed by: STUDENT IN AN ORGANIZED HEALTH CARE EDUCATION/TRAINING PROGRAM

## 2024-08-27 PROCEDURE — G0463 HOSPITAL OUTPT CLINIC VISIT: HCPCS

## 2024-08-27 RX ORDER — PRENATAL WITH FERROUS FUM AND FOLIC ACID 3080; 920; 120; 400; 22; 1.84; 3; 20; 10; 1; 12; 200; 27; 25; 2 [IU]/1; [IU]/1; MG/1; [IU]/1; MG/1; MG/1; MG/1; MG/1; MG/1; MG/1; UG/1; MG/1; MG/1; MG/1; MG/1
1 TABLET ORAL DAILY
Qty: 90 TABLET | Refills: 1 | Status: SHIPPED | OUTPATIENT
Start: 2024-08-27

## 2024-08-27 NOTE — ASSESSMENT & PLAN NOTE
EDC 3/9/22     Asthma  Abnormal cardiac views on ultrasound -status post [facility] consultation with normal cardiac anatomy.  Status post laparoscopic cholecystectomy during pregnancy     COVID vaccine - Reccomended  Tdap vaccine - 12/16/2021 counseled again to getting Tdap 12/30/2021   Flu vaccine

## 2024-08-27 NOTE — NON STRESS TEST
Obstetrical Non-stress Test Interpretation     Name:  Jazmin Mae Heimlich  MRN: [id number]    19 y.o. female  at 32w3d    Indication: decreased fetal movement      Fetal Assessment  Fetal Movement: active  Fetal HR Assessment Method: external  Fetal HR (beats/min): 140  Fetal HR Baseline: normal range  Fetal HR Variability: moderate (amplitude range 6 to 25 bpm)  Fetal HR Accelerations: greater than/equal to 15 bpm, lasting at least 15 seconds  Fetal HR Decelerations: absent    /63   Pulse 101   LMP 2024 (Exact Date) Comment: PT STATES SHE IS PREGNANT PER HOMETEST  SpO2 99%     Reason for test: OB Triage  Date of Test: 2024  Time frame of test: 4344-3237  RN NST Interpretation: Reactive    T pallidum drawn and pending.    Shari Kendrick  2024  11:37 EDT

## 2024-08-27 NOTE — H&P
[facility]  Obstetric History and Physical    Chief Complaint   Patient presents with    Decreased Fetal Movement     Sent from office       Subjective     HPI:    Patient is a 19 y.o. female  currently at 32w3d, who presents to OB ED from the office for decreased fetal movement. She does feel fetal movement but it feels more like flutters rather than large movements as before.    She has been seeing [facility] for her prenatal care.  The pregnancy has been complicated by the following problems:    Patient Active Problem List   Diagnosis    Supervision of other normal pregnancy, antepartum    History of gestational hypertension    Rh negative status during pregnancy in third trimester    Rubella non-immune status, antepartum    Asthma affecting pregnancy, antepartum    Anemia    Chlamydia infection during pregnancy       The following portions of the patients history were reviewed and updated as appropriate:   current medications, allergies, past medical history, past surgical history, past family history, past social history and current problem list.     Prenatal Information:  Prenatal Results       Initial Prenatal Labs       Test Value Reference Range Date Time    Hemoglobin  10.2 g/dL 12.0 - 15.9 24 1548    Hematocrit  31.6 % 34.0 - 46.6 24 1548    Platelets  214 10*3/mm3 140 - 450 24 1548    Rubella IgG  1.94 index Immune >0.99 24 1548    Hepatitis B SAg  Non-Reactive  Non-Reactive 24 1548    Hepatitis C Ab  Non-Reactive  Non-Reactive 24 1548    T. Pallidum Ab   Non-Reactive  Non-Reactive 24 1548    ABO  A   24 1108    Rh  Negative   24 1108    Antibody Screen  Negative   24 1548    HIV  Non-Reactive  Non-Reactive 24 1548    Urine Culture  No growth   24 1546    Gonorrhea  Not Detected  Not Detected  24 1055       Not Detected  Not Detected  24 1546    Chlamydia  Not Detected  Not Detected  24 1055       Detected  Not  Detected  24 1546    Hemoglobinopathy Fractionation  Comment   10/19/21 1135                2nd and 3rd Trimester       Test Value Reference Range Date Time    Hemoglobin (repeated)  10.9 g/dL 12.0 - 15.9 24 1108       10.6 g/dL 12.0 - 15.9 24 1141    Hematocrit (repeated)  33.4 % 34.0 - 46.6 24 1108       32.7 % 34.0 - 46.6 24 1141    Platelets   201 10*3/mm3 140 - 450 24 1108       195 10*3/mm3 140 - 450 24 1141       214 10*3/mm3 140 - 450 24 1548    1 hour GTT   101 mg/dL 65 - 139 24 1108    Antibody Screen (repeated)  Negative   24 1108                Drug Screening       Test Value Reference Range Date Time    Barbiturate Screen  Negative  Negative 24 1546    Benzodiazepine Screen  Negative  Negative 24 1546    Methadone Screen  Negative  Negative 24 1546    Opiates Screen  Negative  Negative 24 1546    THC Screen  Positive  Negative 24 1546    Cocaine Screen  Negative  Negative 24 1546    Oxycodone Screen  Negative  Negative 24 1546              Past OB History:     OB History    Para Term  AB Living   5 1 1 0 3 1   SAB IAB Ectopic Molar Multiple Live Births   2 1 0 0 0 1      # Outcome Date GA Lbr Jignesh/2nd Weight Sex Type Anes PTL Lv   5 Current            4 SAB 23           3 IAB 2023           2 Term 22 40w0d 14:32 / 00:13 3640 g (8 lb 0.4 oz) M Vag-Spont EPI N ZABRINA      Name: HEIMLICH,JAZMINSBOY      Apgar1: 8  Apgar5: 9   1 SAB                Past Medical History: Past Medical History:   Diagnosis Date    Anemia     Chlamydia infection during pregnancy     History of gestational hypertension 2021 patient with elevated blood pressures around time of surgery.          Past Surgical History Past Surgical History:   Procedure Laterality Date    CHOLECYSTECTOMY N/A 2021    Procedure: CHOLECYSTECTOMY LAPAROSCOPIC;  Surgeon: Rasheed Morley MD;  Location:  [facility] MAIN OR;  Service: General;  Laterality: N/A;      Family History: Family History   Problem Relation Age of Onset    Stroke Father     Mental illness Mother     Drug abuse Mother     Alcohol abuse Mother     COPD Maternal Grandmother     Hypertension Maternal Grandmother     Ovarian cancer Neg Hx     Uterine cancer Neg Hx     Breast cancer Neg Hx     Colon cancer Neg Hx     Prostatitis Neg Hx     Prostate cancer Neg Hx     Deep vein thrombosis Neg Hx     Pulmonary embolism Neg Hx       Social History:  reports that she has never smoked. She has been exposed to tobacco smoke. She has never used smokeless tobacco.   reports no history of alcohol use.   reports no history of drug use.        General ROS: Pertinent items are noted in HPI  Home Medications:  Prenatal 27-1, Prenatal MV & Min w/FA-DHA, albuterol sulfate HFA, and ferrous sulfate    Allergies:  Allergies   Allergen Reactions    Penicillins Rash    Robamox [Amoxicillin] Rash       Objective       Vital Signs Range for the last 24 hours  Temperature:     Temp Source:     BP: BP: (100-128)/(63-79) 100/63   Pulse: Heart Rate:  [101] 101   Respirations:     SPO2: SpO2:  [99 %] 99 %     Vitals:    08/27/24 1040   BP: 100/63   Pulse: 101   SpO2: 99%       Physical Examination:   General appearance - alert, well appearing, and in no distress  Mental status - alert, oriented to person, place, and time  Chest - normal respiratory effort  Heart - normal rate  Abdomen - gravid, nontender  Pelvic - deferred  Neurological - alert, oriented, normal speech  Extremities - Edema none  Skin - normal coloration and turgor, no suspicious skin lesions noted     [place]: no contractions   NST: 130s-140s with accels; no decels; category 1    Assessment & Plan     Assessment:  -  Intrauterine pregnancy at 32w3d gestation who presents for: decreased fetal movement    Plan:  - NST reactive  - discussed kick counts  - needs T. Pallidum. Will draw today  - Plan of care has been  reviewed with patient and patient agrees.   - Risks, benefits of treatment plan have been discussed.  - All questions have been answered.        Electronically signed by Eveline Dyer MD, 08/27/24, 11:06 AM EDT.

## 2024-09-05 NOTE — PROGRESS NOTES
Routine Prenatal Visit     Subjective  Jazmin Mae Heimlich is a 19 y.o.  at 34w3d here for her routine OB visit.   She is taking her prenatal vitamins.Reports no loss of fluid or vaginal bleeding. Patient doing well.     Pregnancy complicated by:     Patient Active Problem List   Diagnosis    Supervision of other normal pregnancy, antepartum    History of gestational hypertension    Rh negative status during pregnancy in third trimester    Rubella non-immune status, antepartum    Asthma affecting pregnancy, antepartum    Anemia    Chlamydia infection during pregnancy         OB History    Para Term  AB Living   5 1 1   3 1   SAB IAB Ectopic Molar Multiple Live Births   2 1     0 1      # Outcome Date GA Lbr Jignesh/2nd Weight Sex Type Anes PTL Lv   5 Current            4 SAB 23           3 IAB 2023           2 Term 22 40w0d 14:32 / 00:13 3640 g (8 lb 0.4 oz) M Vag-Spont EPI N ZABRINA   1 SAB                    ROS:  General ROS: negative for - chills or fatigue  Genito-Urinary ROS: negative for  change in urinary stream, vaginal discharge     Objective  Physical Exam:   Vitals:    09/10/24 0924   BP: 125/81       Uterine Size: size equals dates  FHT: 110-160 BPM    General appearance - alert, well appearing, and in no distress  Abdomen- Soft, Gravid uterus, non-tender to palpation  Extremeties: negative swelling       Assessment/Plan:   Diagnoses and all orders for this visit:    1. Supervision of other normal pregnancy, antepartum (Primary)  Assessment & Plan:  CLARIBEL finalized: 10/19/2024 by LMP and 12 US    Genetic testing (NIPS-Quad)/CF/AFP:  NIPS neg XX    COVID: recommended  Flu: recommended  Tdap: 27-36 weeks  RSV: 32-36 weeks    Repeat TPA at 28-32 weeks  1hr gtt: 101    Rhogam: A neg, s/p rhogam  ?Sterilization:    EPDS:     Anatomy US:  estimated fetal weight is 502 g, 1 pound 2 ounces. This is the 35th percentile for gestational age. The estimated gestational age is 22 weeks  and 1 day, which yields an CLARIBEL of 10/22/2024. This is consistent with the previously established CLARIBEL of 10/19/2024. The cervical length is 3.92 cm measured transabdominally. There is no evidence of any cervical funneling. The fetal heart rate is 139 bpm. The maximum vertical pocket of amniotic fluid is 4.17 cm. The placenta is anterior, grade 1. There is no evidence of low-lying placenta or placenta previa. There is a three-vessel umbilical cord with a normal-appearing cord insertion. The gender is female. The fetal cardiac anatomy appears normal. The remainder the fetal anatomical survey also appears normal.   FU US:    PROBLEM LIST/PLAN:   RH-  Hx of GHTN in prior preg  Asthma- albuterol prn  Rubella non-immune  Chlamydia in preg- pos 4/25, HARPREET neg   Anemia- PO iron       Orders:  -     POC Urinalysis Dipstick    2. History of gestational hypertension    3. Rh negative status during pregnancy in third trimester    4. Rubella non-immune status, antepartum    5. Asthma affecting pregnancy, antepartum    6. Gastroesophageal reflux disease without esophagitis  -     famotidine (Pepcid) 20 MG tablet; Take 1 tablet by mouth 2 (Two) Times a Day.  Dispense: 60 tablet; Refill: 1            Counseling:   OB precautions, leaking, VB, dov willis vs PTL/Labor  FKC  GERD in pregnancy discussed.  Recommend smaller meals, avoid lying down at least 2hrs after meals, and avoid foods that trigger it (commonly highly seasoned foods, pizza, italian, mexican etc).  OTC Tums safe.  If using them regularly recommend other medication options that can be prescribed.   Round Ligament Pain:  The uterus has several ligaments which provide support and keep the uterus in place. As the  uterus grows these ligaments are pulled and stretched which often causes sharp stabbing like pain in the inguinal area.   You may find a pregnancy support band helpful. Changing positions may also help. Yoga is a great way to cope with round ligament and low  back pain in pregnancy.    Massage may also help with low back pain   Things to Consider at this Point in your Pregnancy:  Some women experience swelling in their feet during pregnancy. Compression stockings may help  Drink plenty of water and stay active   Make sure you are eating frequent small meals, nuts are a wonderful snack to keep with you            Return in about 2 weeks (around 9/24/2024) for Routine OB visit.      We have gone over prenatal care to include the timing and content of visits. I informed her how to contact the office and/or on call person in the event of any problems and encouraged her to do so when she feels it is necessary.  We then spent time answering her questions which she indicated were answered to her satisfaction.    Ria Bernal DO  9/10/2024 17:42 EDT

## 2024-09-10 ENCOUNTER — ROUTINE PRENATAL (OUTPATIENT)
Dept: OBSTETRICS AND GYNECOLOGY | Facility: CLINIC | Age: 19
End: 2024-09-10
Payer: COMMERCIAL

## 2024-09-10 VITALS — DIASTOLIC BLOOD PRESSURE: 81 MMHG | WEIGHT: 190 LBS | SYSTOLIC BLOOD PRESSURE: 125 MMHG | BODY MASS INDEX: 31.62 KG/M2

## 2024-09-10 DIAGNOSIS — J45.909 ASTHMA AFFECTING PREGNANCY, ANTEPARTUM: ICD-10-CM

## 2024-09-10 DIAGNOSIS — K21.9 GASTROESOPHAGEAL REFLUX DISEASE WITHOUT ESOPHAGITIS: ICD-10-CM

## 2024-09-10 DIAGNOSIS — Z87.59 HISTORY OF GESTATIONAL HYPERTENSION: ICD-10-CM

## 2024-09-10 DIAGNOSIS — Z67.91 RH NEGATIVE STATUS DURING PREGNANCY IN THIRD TRIMESTER: ICD-10-CM

## 2024-09-10 DIAGNOSIS — Z34.80 SUPERVISION OF OTHER NORMAL PREGNANCY, ANTEPARTUM: Primary | ICD-10-CM

## 2024-09-10 DIAGNOSIS — O09.899 RUBELLA NON-IMMUNE STATUS, ANTEPARTUM: ICD-10-CM

## 2024-09-10 DIAGNOSIS — O26.893 RH NEGATIVE STATUS DURING PREGNANCY IN THIRD TRIMESTER: ICD-10-CM

## 2024-09-10 DIAGNOSIS — Z28.39 RUBELLA NON-IMMUNE STATUS, ANTEPARTUM: ICD-10-CM

## 2024-09-10 DIAGNOSIS — O99.519 ASTHMA AFFECTING PREGNANCY, ANTEPARTUM: ICD-10-CM

## 2024-09-10 LAB
GLUCOSE UR STRIP-MCNC: NEGATIVE MG/DL
PROT UR STRIP-MCNC: NEGATIVE MG/DL

## 2024-09-10 PROCEDURE — 99213 OFFICE O/P EST LOW 20 MIN: CPT | Performed by: STUDENT IN AN ORGANIZED HEALTH CARE EDUCATION/TRAINING PROGRAM

## 2024-09-10 RX ORDER — FAMOTIDINE 20 MG/1
20 TABLET, FILM COATED ORAL 2 TIMES DAILY
Qty: 60 TABLET | Refills: 1 | Status: SHIPPED | OUTPATIENT
Start: 2024-09-10 | End: 2025-09-10

## 2024-09-10 NOTE — ASSESSMENT & PLAN NOTE
CLARIBEL finalized: 10/19/2024 by LMP and 12 US    Genetic testing (NIPS-Quad)/CF/AFP:  NIPS neg XX    COVID: recommended  Flu: recommended  Tdap: 27-36 weeks  RSV: 32-36 weeks    Repeat TPA at 28-32 weeks  1hr gtt: 101    Rhogam: A neg, s/p rhogam  ?Sterilization:    EPDS:     Anatomy US: 6/19 estimated fetal weight is 502 g, 1 pound 2 ounces. This is the 35th percentile for gestational age. The estimated gestational age is 22 weeks and 1 day, which yields an CLARIBEL of 10/22/2024. This is consistent with the previously established CLARIBEL of 10/19/2024. The cervical length is 3.92 cm measured transabdominally. There is no evidence of any cervical funneling. The fetal heart rate is 139 bpm. The maximum vertical pocket of amniotic fluid is 4.17 cm. The placenta is anterior, grade 1. There is no evidence of low-lying placenta or placenta previa. There is a three-vessel umbilical cord with a normal-appearing cord insertion. The gender is female. The fetal cardiac anatomy appears normal. The remainder the fetal anatomical survey also appears normal.   FU US:    PROBLEM LIST/PLAN:   RH-  Hx of GHTN in prior preg  Asthma- albuterol prn  Rubella non-immune  Chlamydia in preg- pos 4/25, HARPREET neg   Anemia- PO iron

## 2024-09-11 RX ORDER — FAMOTIDINE 20 MG/1
20 TABLET, FILM COATED ORAL 2 TIMES DAILY
Qty: 180 TABLET | OUTPATIENT
Start: 2024-09-11

## 2024-09-11 NOTE — TELEPHONE ENCOUNTER
Denied 90 day supply on Pepcid.  Last filled 9/10/24 Pepcid # 60 with 1 refill.  Last seen 9/10/24.  Next appointment 9/24/24

## 2024-09-16 ENCOUNTER — TELEPHONE (OUTPATIENT)
Dept: OBSTETRICS AND GYNECOLOGY | Facility: CLINIC | Age: 19
End: 2024-09-16
Payer: COMMERCIAL

## 2024-09-24 ENCOUNTER — ROUTINE PRENATAL (OUTPATIENT)
Dept: OBSTETRICS AND GYNECOLOGY | Facility: CLINIC | Age: 19
End: 2024-09-24
Payer: COMMERCIAL

## 2024-09-24 ENCOUNTER — TELEPHONE (OUTPATIENT)
Dept: OBSTETRICS AND GYNECOLOGY | Facility: CLINIC | Age: 19
End: 2024-09-24

## 2024-09-24 VITALS — SYSTOLIC BLOOD PRESSURE: 124 MMHG | BODY MASS INDEX: 32.12 KG/M2 | DIASTOLIC BLOOD PRESSURE: 80 MMHG | WEIGHT: 193 LBS

## 2024-09-24 DIAGNOSIS — O09.899 RUBELLA NON-IMMUNE STATUS, ANTEPARTUM: ICD-10-CM

## 2024-09-24 DIAGNOSIS — O99.519 ASTHMA AFFECTING PREGNANCY, ANTEPARTUM: ICD-10-CM

## 2024-09-24 DIAGNOSIS — Z67.91 RH NEGATIVE STATUS DURING PREGNANCY IN THIRD TRIMESTER: ICD-10-CM

## 2024-09-24 DIAGNOSIS — Z87.59 HISTORY OF GESTATIONAL HYPERTENSION: ICD-10-CM

## 2024-09-24 DIAGNOSIS — O26.893 RH NEGATIVE STATUS DURING PREGNANCY IN THIRD TRIMESTER: ICD-10-CM

## 2024-09-24 DIAGNOSIS — O99.019 MATERNAL ANEMIA IN PREGNANCY, ANTEPARTUM: ICD-10-CM

## 2024-09-24 DIAGNOSIS — A74.9 CHLAMYDIA INFECTION DURING PREGNANCY: ICD-10-CM

## 2024-09-24 DIAGNOSIS — O98.819 CHLAMYDIA INFECTION DURING PREGNANCY: ICD-10-CM

## 2024-09-24 DIAGNOSIS — J45.909 ASTHMA AFFECTING PREGNANCY, ANTEPARTUM: ICD-10-CM

## 2024-09-24 DIAGNOSIS — Z34.80 SUPERVISION OF OTHER NORMAL PREGNANCY, ANTEPARTUM: Primary | ICD-10-CM

## 2024-09-24 DIAGNOSIS — Z28.39 RUBELLA NON-IMMUNE STATUS, ANTEPARTUM: ICD-10-CM

## 2024-09-24 LAB
C TRACH RRNA CVX QL NAA+PROBE: NOT DETECTED
DEPRECATED RDW RBC AUTO: 44 FL (ref 37–54)
ERYTHROCYTE [DISTWIDTH] IN BLOOD BY AUTOMATED COUNT: 13.4 % (ref 12.3–15.4)
GLUCOSE UR STRIP-MCNC: NEGATIVE MG/DL
HCT VFR BLD AUTO: 31.4 % (ref 34–46.6)
HGB BLD-MCNC: 10.4 G/DL (ref 12–15.9)
MCH RBC QN AUTO: 30 PG (ref 26.6–33)
MCHC RBC AUTO-ENTMCNC: 33.1 G/DL (ref 31.5–35.7)
MCV RBC AUTO: 90.5 FL (ref 79–97)
N GONORRHOEA RRNA SPEC QL NAA+PROBE: NOT DETECTED
PLATELET # BLD AUTO: 212 10*3/MM3 (ref 140–450)
PMV BLD AUTO: 9.9 FL (ref 6–12)
PROT UR STRIP-MCNC: NEGATIVE MG/DL
RBC # BLD AUTO: 3.47 10*6/MM3 (ref 3.77–5.28)
WBC NRBC COR # BLD AUTO: 11.99 10*3/MM3 (ref 3.4–10.8)

## 2024-09-24 PROCEDURE — 87491 CHLMYD TRACH DNA AMP PROBE: CPT | Performed by: OBSTETRICS & GYNECOLOGY

## 2024-09-24 PROCEDURE — 87653 STREP B DNA AMP PROBE: CPT | Performed by: OBSTETRICS & GYNECOLOGY

## 2024-09-24 PROCEDURE — 85027 COMPLETE CBC AUTOMATED: CPT | Performed by: OBSTETRICS & GYNECOLOGY

## 2024-09-24 PROCEDURE — 87591 N.GONORRHOEAE DNA AMP PROB: CPT | Performed by: OBSTETRICS & GYNECOLOGY

## 2024-09-24 PROCEDURE — 99214 OFFICE O/P EST MOD 30 MIN: CPT | Performed by: OBSTETRICS & GYNECOLOGY

## 2024-09-25 LAB — GROUP B STREP, DNA: NEGATIVE

## 2024-09-27 ENCOUNTER — TELEPHONE (OUTPATIENT)
Dept: OBSTETRICS AND GYNECOLOGY | Facility: CLINIC | Age: 19
End: 2024-09-27

## 2024-09-27 NOTE — TELEPHONE ENCOUNTER
Caller: Heimlich, Jazmin Mae    Relationship: Self    Best call back number: [phone]    What is the best time to reach you: ASAP    Who are you requesting to speak with (clinical staff, provider,  specific staff member):         What was the call regarding:     PT CURRENTLY HAS OB F/U ON 9/30/2024  SHE IS HAVING TRANSPORTATION ISSUES AND WOULD LIKE TO BE SEEN ON 10/1/2024 @ AROUND 9:00 AM    PLEASE CALL PT AND ADVISE

## 2024-10-01 ENCOUNTER — ROUTINE PRENATAL (OUTPATIENT)
Dept: OBSTETRICS AND GYNECOLOGY | Facility: CLINIC | Age: 19
End: 2024-10-01
Payer: COMMERCIAL

## 2024-10-01 VITALS — SYSTOLIC BLOOD PRESSURE: 125 MMHG | BODY MASS INDEX: 32.28 KG/M2 | WEIGHT: 194 LBS | DIASTOLIC BLOOD PRESSURE: 75 MMHG

## 2024-10-01 DIAGNOSIS — O99.519 ASTHMA AFFECTING PREGNANCY, ANTEPARTUM: ICD-10-CM

## 2024-10-01 DIAGNOSIS — Z87.59 HISTORY OF GESTATIONAL HYPERTENSION: ICD-10-CM

## 2024-10-01 DIAGNOSIS — Z67.91 RH NEGATIVE STATUS DURING PREGNANCY IN THIRD TRIMESTER: ICD-10-CM

## 2024-10-01 DIAGNOSIS — O98.819 CHLAMYDIA INFECTION DURING PREGNANCY: ICD-10-CM

## 2024-10-01 DIAGNOSIS — J45.909 ASTHMA AFFECTING PREGNANCY, ANTEPARTUM: ICD-10-CM

## 2024-10-01 DIAGNOSIS — O26.893 RH NEGATIVE STATUS DURING PREGNANCY IN THIRD TRIMESTER: ICD-10-CM

## 2024-10-01 DIAGNOSIS — O09.899 RUBELLA NON-IMMUNE STATUS, ANTEPARTUM: ICD-10-CM

## 2024-10-01 DIAGNOSIS — Z28.39 RUBELLA NON-IMMUNE STATUS, ANTEPARTUM: ICD-10-CM

## 2024-10-01 DIAGNOSIS — A74.9 CHLAMYDIA INFECTION DURING PREGNANCY: ICD-10-CM

## 2024-10-01 DIAGNOSIS — O99.019 MATERNAL ANEMIA IN PREGNANCY, ANTEPARTUM: ICD-10-CM

## 2024-10-01 DIAGNOSIS — Z34.80 SUPERVISION OF OTHER NORMAL PREGNANCY, ANTEPARTUM: Primary | ICD-10-CM

## 2024-10-01 LAB
GLUCOSE UR STRIP-MCNC: NEGATIVE MG/DL
PROT UR STRIP-MCNC: NEGATIVE MG/DL

## 2024-10-01 PROCEDURE — 99214 OFFICE O/P EST MOD 30 MIN: CPT | Performed by: OBSTETRICS & GYNECOLOGY

## 2024-10-01 NOTE — PROGRESS NOTES
Routine Prenatal Visit     Subjective  Jazmin Mae Heimlich is a 19 y.o.  at 37w3d here for her routine OB visit.   She is taking her prenatal vitamins.Reports no loss of fluid or vaginal bleeding. Patient doing well without any complaints. Pregnancy complicated by:     Patient Active Problem List   Diagnosis    Supervision of other normal pregnancy, antepartum    History of gestational hypertension    Rh negative status during pregnancy in third trimester    Asthma affecting pregnancy, antepartum    Anemia    Chlamydia infection during pregnancy         OB History    Para Term  AB Living   5 1 1   3 1   SAB IAB Ectopic Molar Multiple Live Births   2 1     0 1      # Outcome Date GA Lbr Jignesh/2nd Weight Sex Type Anes PTL Lv   5 Current            4 SAB 23           3 IAB 2023           2 Term 22 40w0d 14:32 / 00:13 3640 g (8 lb 0.4 oz) M Vag-Spont EPI N ZABRINA   1 SAB                    ROS:   General ROS: negative for - chills or fatigue  Respiratory ROS: negative for - cough or hemoptysis  Cardiovascular ROS: negative for - chest pain or dyspnea on exertion  Genito-Urinary ROS: negative for  change in urinary stream, vaginal discharge   Musculoskeletal ROS: negative for - gait disturbance or joint pain  Dermatological ROS: negative for acne,  dry skin or itching    Objective  Physical Exam:   Vitals:    10/01/24 0921   BP: 125/75       Uterine Size: size equals dates  FHT: 110-160 BPM    General appearance - alert, well appearing, and in no distress  Mental status - alert, oriented to person, place, and time  Abdomen- Soft, Gravid uterus, non-tender to palpation  Musculoskeletal: negative for - gait disturbance or joint pain  Extremeties: negative swelling or cyanosis   Dermatological: negative rashes or skin lesions   1/20%/-3/posterior/soft    Assessment/Plan:   Diagnoses and all orders for this visit:    1. Supervision of other normal pregnancy, antepartum (Primary)  -     POC  Urinalysis Dipstick    2. History of gestational hypertension    3. Rh negative status during pregnancy in third trimester  Comments:  Recieved Rhogam    4. Rubella non-immune status, antepartum  Comments:  MMR PP    5. Asthma affecting pregnancy, antepartum  Comments:  Stable, albuterol PRN    6. Maternal anemia in pregnancy, antepartum  Comments:  iron supplementation    7. Chlamydia infection during pregnancy  Comments:  HARPREET negative  Negative at 36 weeks with GBS            Counseling:   OB precautions, leaking, VB, dov willis vs PTL/Labor  FKC  HTN precautions reviewed: HA, vision change, RUQ/epigastric pain, edema  Round Ligament Pain:  The uterus has several ligaments which provide support and keep the uterus in place. As the  uterus grows these ligaments are pulled and stretched which often causes sharp stabbing like pain in the inguinal area.   You may find a pregnancy support band helpful. Changing positions may also help. Yoga is a great way to cope with round ligament and low back pain in pregnancy.    Massage may also help with low back pain   Things to Consider at this Point in your Pregnancy:  Some women experience swelling in their feet during pregnancy. Compression stockings may help  Drink plenty of water and stay active   Make sure you are eating frequent small meals, nuts are a wonderful snack to keep with you            Return in about 1 week (around 10/8/2024) for Routine OB visit.      We have gone over prenatal care to include the timing and content of visits. I informed her how to contact the office and/or on call person in the event of any problems and encouraged her to do so when she feels it is necessary.  We then spent time answering her questions which she indicated were answered to her satisfaction.    Carol Hoover DO  10/1/2024 09:35 EDT

## 2024-10-08 ENCOUNTER — ROUTINE PRENATAL (OUTPATIENT)
Dept: OBSTETRICS AND GYNECOLOGY | Facility: CLINIC | Age: 19
End: 2024-10-08
Payer: COMMERCIAL

## 2024-10-08 VITALS — BODY MASS INDEX: 32.78 KG/M2 | WEIGHT: 197 LBS | DIASTOLIC BLOOD PRESSURE: 79 MMHG | SYSTOLIC BLOOD PRESSURE: 124 MMHG

## 2024-10-08 DIAGNOSIS — O99.519 ASTHMA AFFECTING PREGNANCY, ANTEPARTUM: ICD-10-CM

## 2024-10-08 DIAGNOSIS — Z28.39 RUBELLA NON-IMMUNE STATUS, ANTEPARTUM: ICD-10-CM

## 2024-10-08 DIAGNOSIS — Z67.91 RH NEGATIVE STATUS DURING PREGNANCY IN THIRD TRIMESTER: ICD-10-CM

## 2024-10-08 DIAGNOSIS — Z87.59 HISTORY OF GESTATIONAL HYPERTENSION: ICD-10-CM

## 2024-10-08 DIAGNOSIS — O99.019 MATERNAL ANEMIA IN PREGNANCY, ANTEPARTUM: ICD-10-CM

## 2024-10-08 DIAGNOSIS — J45.909 ASTHMA AFFECTING PREGNANCY, ANTEPARTUM: ICD-10-CM

## 2024-10-08 DIAGNOSIS — O09.899 RUBELLA NON-IMMUNE STATUS, ANTEPARTUM: ICD-10-CM

## 2024-10-08 DIAGNOSIS — Z34.80 SUPERVISION OF OTHER NORMAL PREGNANCY, ANTEPARTUM: Primary | ICD-10-CM

## 2024-10-08 DIAGNOSIS — O26.893 RH NEGATIVE STATUS DURING PREGNANCY IN THIRD TRIMESTER: ICD-10-CM

## 2024-10-08 LAB
GLUCOSE UR STRIP-MCNC: NEGATIVE MG/DL
PROT UR STRIP-MCNC: NEGATIVE MG/DL

## 2024-10-08 NOTE — PROGRESS NOTES
Routine Prenatal Visit     Subjective  Jazmin Mae Heimlich is a 19 y.o.  at 38w3d here for her routine OB visit.   She is taking her prenatal vitamins.Reports no loss of fluid or vaginal bleeding. Patient doing well without any complaints. Pregnancy complicated by:     Patient Active Problem List   Diagnosis    Supervision of other normal pregnancy, antepartum    History of gestational hypertension    Rh negative status during pregnancy in third trimester    Asthma affecting pregnancy, antepartum    Anemia    Chlamydia infection during pregnancy         OB History    Para Term  AB Living   5 1 1   3 1   SAB IAB Ectopic Molar Multiple Live Births   2 1     0 1      # Outcome Date GA Lbr Jignesh/2nd Weight Sex Type Anes PTL Lv   5 Current            4 SAB 23           3 IAB 2023           2 Term 22 40w0d 14:32 / 00:13 3640 g (8 lb 0.4 oz) M Vag-Spont EPI N ZABRINA   1 SAB                    ROS:   General ROS: negative for - chills or fatigue  Respiratory ROS: negative for - cough or hemoptysis  Cardiovascular ROS: negative for - chest pain or dyspnea on exertion  Genito-Urinary ROS: negative for  change in urinary stream, vaginal discharge   Musculoskeletal ROS: negative for - gait disturbance or joint pain  Dermatological ROS: negative for acne,  dry skin or itching    Objective  Physical Exam:   Vitals:    10/08/24 1105   BP: 124/79       Uterine Size: size equals dates  FHT: 110-160 BPM    General appearance - alert, well appearing, and in no distress  Mental status - alert, oriented to person, place, and time  Abdomen- Soft, Gravid uterus, non-tender to palpation  Musculoskeletal: negative for - gait disturbance or joint pain  Extremeties: negative swelling or cyanosis   Dermatological: negative rashes or skin lesions   Membrane stripping performed at pt request.  Discussed potential risks/benefits.    Assessment/Plan:   Diagnoses and all orders for this visit:    1. Supervision of  other normal pregnancy, antepartum (Primary)  -     POC Urinalysis Dipstick    2. History of gestational hypertension    3. Rh negative status during pregnancy in third trimester  Comments:  Recieved rhogam 7/29/24    4. Rubella non-immune status, antepartum  Comments:  MMR PP    5. Asthma affecting pregnancy, antepartum  Comments:  Albuterol PRN    6. Maternal anemia in pregnancy, antepartum  Comments:  iron supplementation            Counseling:   OB precautions, leaking, VB, dov willis vs PTL/Labor  FKC  HTN precautions reviewed: HA, vision change, RUQ/epigastric pain, edema  Round Ligament Pain:  The uterus has several ligaments which provide support and keep the uterus in place. As the  uterus grows these ligaments are pulled and stretched which often causes sharp stabbing like pain in the inguinal area.   You may find a pregnancy support band helpful. Changing positions may also help. Yoga is a great way to cope with round ligament and low back pain in pregnancy.    Massage may also help with low back pain   Things to Consider at this Point in your Pregnancy:  Some women experience swelling in their feet during pregnancy. Compression stockings may help  Drink plenty of water and stay active   Make sure you are eating frequent small meals, nuts are a wonderful snack to keep with you            Return in about 4 weeks (around 11/5/2024) for Postpartum visit.      We have gone over prenatal care to include the timing and content of visits. I informed her how to contact the office and/or on call person in the event of any problems and encouraged her to do so when she feels it is necessary.  We then spent time answering her questions which she indicated were answered to her satisfaction.    Carol Hoover DO  10/8/2024 11:27 EDT

## 2024-10-14 ENCOUNTER — TELEPHONE (OUTPATIENT)
Dept: OBSTETRICS AND GYNECOLOGY | Facility: CLINIC | Age: 19
End: 2024-10-14
Payer: COMMERCIAL

## 2024-10-15 ENCOUNTER — HOSPITAL ENCOUNTER (OUTPATIENT)
Facility: HOSPITAL | Age: 19
Discharge: HOME | End: 2024-10-15
Attending: OBSTETRICS & GYNECOLOGY | Admitting: OBSTETRICS & GYNECOLOGY
Payer: COMMERCIAL

## 2024-10-15 VITALS
WEIGHT: 197 LBS | DIASTOLIC BLOOD PRESSURE: 61 MMHG | HEIGHT: 65 IN | BODY MASS INDEX: 32.82 KG/M2 | OXYGEN SATURATION: 100 % | HEART RATE: 117 BPM | RESPIRATION RATE: 18 BRPM | TEMPERATURE: 98.1 F | SYSTOLIC BLOOD PRESSURE: 136 MMHG

## 2024-10-15 LAB
BILIRUB BLD-MCNC: NEGATIVE MG/DL
CLARITY, POC: CLEAR
COLOR UR: YELLOW
GLUCOSE UR STRIP-MCNC: NEGATIVE MG/DL
KETONES UR QL: NEGATIVE
LEUKOCYTE EST, POC: ABNORMAL
NITRITE UR-MCNC: NEGATIVE MG/ML
PH UR: 7 [PH] (ref 5–8)
PROT UR STRIP-MCNC: NEGATIVE MG/DL
RBC # UR STRIP: NEGATIVE /UL
SP GR UR: 1.02 (ref 1–1.03)
UROBILINOGEN UR QL: NORMAL

## 2024-10-15 PROCEDURE — G0463 HOSPITAL OUTPT CLINIC VISIT: HCPCS

## 2024-10-15 PROCEDURE — 59025 FETAL NON-STRESS TEST: CPT

## 2024-10-15 PROCEDURE — 81002 URINALYSIS NONAUTO W/O SCOPE: CPT | Performed by: OBSTETRICS & GYNECOLOGY

## 2024-10-16 ENCOUNTER — HOSPITAL ENCOUNTER (INPATIENT)
Facility: HOSPITAL | Age: 19
LOS: 2 days | Discharge: HOME | End: 2024-10-18
Attending: OBSTETRICS & GYNECOLOGY | Admitting: OBSTETRICS & GYNECOLOGY
Payer: COMMERCIAL

## 2024-10-16 PROCEDURE — 84112 EVAL AMNIOTIC FLUID PROTEIN: CPT | Performed by: OBSTETRICS & GYNECOLOGY

## 2024-10-16 PROCEDURE — 80307 DRUG TEST PRSMV CHEM ANLYZR: CPT | Performed by: OBSTETRICS & GYNECOLOGY

## 2024-10-16 RX ORDER — ONDANSETRON 2 MG/ML
4 INJECTION INTRAMUSCULAR; INTRAVENOUS EVERY 6 HOURS PRN
Status: DISCONTINUED | OUTPATIENT
Start: 2024-10-16 | End: 2024-10-17 | Stop reason: HOSPADM

## 2024-10-16 RX ORDER — ONDANSETRON 4 MG/1
4 TABLET, ORALLY DISINTEGRATING ORAL EVERY 6 HOURS PRN
Status: DISCONTINUED | OUTPATIENT
Start: 2024-10-16 | End: 2024-10-17 | Stop reason: HOSPADM

## 2024-10-16 RX ORDER — MAGNESIUM CARB/ALUMINUM HYDROX 105-160MG
30 TABLET,CHEWABLE ORAL ONCE AS NEEDED
Status: COMPLETED | OUTPATIENT
Start: 2024-10-16 | End: 2024-10-17

## 2024-10-16 RX ORDER — SODIUM CHLORIDE, SODIUM LACTATE, POTASSIUM CHLORIDE, CALCIUM CHLORIDE 600; 310; 30; 20 MG/100ML; MG/100ML; MG/100ML; MG/100ML
30 INJECTION, SOLUTION INTRAVENOUS CONTINUOUS
Status: ACTIVE | OUTPATIENT
Start: 2024-10-17 | End: 2024-10-18

## 2024-10-16 RX ORDER — ACETAMINOPHEN 325 MG/1
650 TABLET ORAL EVERY 4 HOURS PRN
Status: DISCONTINUED | OUTPATIENT
Start: 2024-10-16 | End: 2024-10-17 | Stop reason: HOSPADM

## 2024-10-16 NOTE — NURSING NOTE
39.3 weeks presents with c/o back pain for a week that has become more uncomfortable this afternoon, states also mucus discharge, and losing her mucus plug since the  when  swept her membranes and said she was dilated 1cm and the baby's head was low, pt. Denies vaginal bleeding, states +fm noted, irreg. Ctx: noted on monitor and occasional uterine irrit., sve /-2 posterior, no blood or fluid noted on exam glove, states she is scheduled for IOL , at nurses station, informed of the above, monitor strip viewed,  in to see pt.,viewed dip urine results, order received to d/c home.

## 2024-10-16 NOTE — NURSING NOTE
Pt. D/c home in stable condition, labor precautions give, home and follow up care instructions, pt. Voiced understanding, pt. Amb. To awaiting car without distress noted, family at side.

## 2024-10-16 NOTE — OBED NOTES
MEME Dumont  Obstetric History and Physical    Chief Complaint   Patient presents with    Back Pain     Vaginal discharge       Subjective     Patient is a 19 y.o. female  currently at 39w4d, who presents with complaint of contractions and losing mucous plug.  She denies any loss of fluid or vaginal bleeding.  Positive fetal movements..    PNC provided by:  [facility] [place]. Her prenatal care is benign.  Her previous obstetric/gynecological history is noted for is non-contributory.    The following portions of the patients history were reviewed and updated as appropriate: current medications, allergies, past medical history, past surgical history, past family history, past social history, and problem list .       Prenatal Information:  Prenatal Results       Initial Prenatal Labs       Test Value Reference Range Date Time    Hemoglobin  10.2 g/dL 12.0 - 15.9 24 1548    Hematocrit  31.6 % 34.0 - 46.6 24 1548    Platelets  214 10*3/mm3 140 - 450 24 1548    Rubella IgG  1.94 index Immune >0.99 24 1548    Hepatitis B SAg  Non-Reactive  Non-Reactive 24 1548    Hepatitis C Ab  Non-Reactive  Non-Reactive 24 1548    RPR        T. Pallidum Ab   Non-Reactive  Non-Reactive 24 1117       Non-Reactive  Non-Reactive 24 1548    ABO  A   24 1108    Rh  Negative   24 1108    Antibody Screen  Negative   24 1548    HIV  Non-Reactive  Non-Reactive 24 1548    Urine Culture  No growth   24 1546    Gonorrhea  Not Detected  Not Detected  24 0848       Not Detected  Not Detected  24 1055       Not Detected  Not Detected  24 1546    Chlamydia  Not Detected  Not Detected  24 0848       Not Detected  Not Detected  24 1055       Detected  Not Detected  24 1546    TSH        HgB A1c         Varicella IgG        Hemoglobinopathy Fractionation  Comment   10/19/21 1135    Hemoglobinopathy (genetic testing)        Cystic fibrosis                    Fetal testing        Test Value Reference Range Date Time    NIPT        MSAFP        AFP-4                  2nd and 3rd Trimester       Test Value Reference Range Date Time    Hemoglobin (repeated)  10.4 g/dL 12.0 - 15.9 09/24/24 0900       10.9 g/dL 12.0 - 15.9 07/29/24 1108       10.6 g/dL 12.0 - 15.9 06/19/24 1141    Hematocrit (repeated)  31.4 % 34.0 - 46.6 09/24/24 0900       33.4 % 34.0 - 46.6 07/29/24 1108       32.7 % 34.0 - 46.6 06/19/24 1141    Platelets   212 10*3/mm3 140 - 450 09/24/24 0900       201 10*3/mm3 140 - 450 07/29/24 1108       195 10*3/mm3 140 - 450 06/19/24 1141       214 10*3/mm3 140 - 450 04/25/24 1548    1 hour GTT   101 mg/dL 65 - 139 07/29/24 1108    Antibody Screen (repeated)  Negative   07/29/24 1108    3rd TM syphilis scrn (repeated)  RPR         3rd TM syphilis scrn (repeated) TP-Ab  Non-Reactive  Non-Reactive 08/27/24 1117    3rd TM syphilis screen TB-Ab (FTA)  Non-Reactive  Non-Reactive 08/27/24 1117    Syphilis cascade test TP-Ab (EIA)        Syphilis cascade TPPA        GTT Fasting        GTT 1 Hr        GTT 2 Hr        GTT 3 Hr        Group B Strep  Negative  Negative 09/24/24 0848              Other testing        Test Value Reference Range Date Time    Parvo IgG         CMV IgG                   Drug Screening       Test Value Reference Range Date Time    Amphetamine Screen        Barbiturate Screen  Negative  Negative 04/25/24 1546    Benzodiazepine Screen  Negative  Negative 04/25/24 1546    Methadone Screen  Negative  Negative 04/25/24 1546    Phencyclidine Screen        Opiates Screen  Negative  Negative 04/25/24 1546    THC Screen  Positive  Negative 04/25/24 1546    Cocaine Screen  Negative  Negative 04/25/24 1546    Propoxyphene Screen        Buprenorphine Screen        Methamphetamine Screen        Oxycodone Screen  Negative  Negative 04/25/24 1546    Tricyclic Antidepressants Screen                  Legend    ^: Historical                          External  Prenatal Results       Pregnancy Outside Results - Transcribed From Office Records - See Scanned Records For Details       Test Value Date Time    ABO  A  07/29/24 1108    Rh  Negative  07/29/24 1108    Antibody Screen  Negative  07/29/24 1108       Negative  04/25/24 1548    Varicella IgG       Rubella  1.94 index 04/25/24 1548    Hgb  10.4 g/dL 09/24/24 0900       10.9 g/dL 07/29/24 1108       10.6 g/dL 06/19/24 1141       10.2 g/dL 04/25/24 1548    Hct  31.4 % 09/24/24 0900       33.4 % 07/29/24 1108       32.7 % 06/19/24 1141       31.6 % 04/25/24 1548    HgB A1c        1h GTT  101 mg/dL 07/29/24 1108    3h GTT Fasting       3h GTT 1 hour       3h GTT 2 hour       3h GTT 3 hour        Gonorrhea (discrete)  Not Detected  09/24/24 0848       Not Detected  05/22/24 1055       Not Detected  04/25/24 1546    Chlamydia (discrete)  Not Detected  09/24/24 0848       Not Detected  05/22/24 1055       Detected  04/25/24 1546    RPR       Syphils cascade: TP-Ab (FTA)  Non-Reactive  08/27/24 1117       Non-Reactive  04/25/24 1548    TP-Ab  Non-Reactive  08/27/24 1117       Non-Reactive  04/25/24 1548    TP-Ab (EIA)       TPPA       HBsAg  Non-Reactive  04/25/24 1548    Herpes Simplex Virus PCR       Herpes Simplex VIrus Culture       HIV  Non-Reactive  04/25/24 1548    Hep C RNA Quant PCR       Hep C Antibody  Non-Reactive  04/25/24 1548    AFP       NIPT       Cystic Fibroisis        Group B Strep  Negative  09/24/24 0848    GBS Susceptibility to Clindamycin       GBS Susceptibility to Erythromycin       Fetal Fibronectin       Genetic Testing, Maternal Blood                 Drug Screening       Test Value Date Time    Urine Drug Screen       Amphetamine Screen       Barbiturate Screen  Negative  04/25/24 1546    Benzodiazepine Screen  Negative  04/25/24 1546    Methadone Screen  Negative  04/25/24 1546    Phencyclidine Screen       Opiates Screen  Negative  04/25/24 1546    THC Screen  Positive  04/25/24 1546    Cocaine  Screen       Propoxyphene Screen       Buprenorphine Screen       Methamphetamine Screen       Oxycodone Screen  Negative  24 1546    Tricyclic Antidepressants Screen                 Legend    ^: Historical                             Past OB History:     OB History    Para Term  AB Living   5 1 1 0 3 1   SAB IAB Ectopic Molar Multiple Live Births   2 1 0 0 0 1      # Outcome Date GA Lbr Jignesh/2nd Weight Sex Type Anes PTL Lv   5 Current            4 SAB 23           3 IAB 2023           2 Term 22 40w0d 14:32 / 00:13 3640 g (8 lb 0.4 oz) M Vag-Spont EPI N ZABRINA      Name: HEIMLICH,JAZMINSBOY      Apgar1: 8  Apgar5: 9   1 SAB                Past Medical History: Past Medical History:   Diagnosis Date    Anemia     Chlamydia infection during pregnancy     History of gestational hypertension 2021 patient with elevated blood pressures around time of surgery.          Past Surgical History Past Surgical History:   Procedure Laterality Date    CHOLECYSTECTOMY N/A 2021    Procedure: CHOLECYSTECTOMY LAPAROSCOPIC;  Surgeon: Rasheed Morley MD;  [facility];  Service: General;  Laterality: N/A;      Family History: Family History   Problem Relation Age of Onset    Stroke Father     Mental illness Mother     Drug abuse Mother     Alcohol abuse Mother     COPD Maternal Grandmother     Hypertension Maternal Grandmother     Ovarian cancer Neg Hx     Uterine cancer Neg Hx     Breast cancer Neg Hx     Colon cancer Neg Hx     Prostatitis Neg Hx     Prostate cancer Neg Hx     Deep vein thrombosis Neg Hx     Pulmonary embolism Neg Hx       Social History:  reports that she has never smoked. She has been exposed to tobacco smoke. She has never used smokeless tobacco.   reports no history of alcohol use.   reports no history of drug use.        General ROS: Pertinent items are noted in HPI    Objective       Vital Signs Range for the last 24 hours  Temperature: Temp:   [98.1 °F (36.7 °C)] 98.1 °F (36.7 °C)   Temp Source: Temp src: Oral   BP: BP: (136)/(61) 136/61   Pulse: Heart Rate:  [117] 117   Respirations: Resp:  [18] 18   SPO2: SpO2:  [100 %] 100 %   O2 Amount (l/min):     O2 Devices     Weight: Weight:  [89.4 kg (197 lb)] 89.4 kg (197 lb)     Physical Examination: General appearance - alert, well appearing, and in no distress  Mental status - alert, oriented to person, place, and time  Chest - clear to auscultation, no wheezes, rales or rhonchi, symmetric air entry  Heart - normal rate, regular rhythm, normal S1, S2, no murmurs, rubs, clicks or gallops  Abdomen - soft, nontender, gravid  Extremities - peripheral pulses normal, no pedal edema, no clubbing or cyanosis  Skin - normal coloration and turgor, no rashes, no suspicious skin lesions noted    Presentation: Vertex   Cervix: Exam by: Method: sterile vaginal exam performed   Dilation: Cervical Dilation (cm): 1   Effacement: Cervical Effacement: 50   Station:         Fetal Heart Rate Assessment   Method: Fetal HR Assessment Method: external   Beats/min: Fetal HR (beats/min): 145   Baseline: Fetal HR Baseline: normal range   Variability: Fetal HR Variability: moderate (amplitude range 6 to 25 bpm)   Accels: Fetal HR Accelerations: episodic, greater than/equal to 15 bpm, lasting at least 15 seconds   Decels: Fetal HR Decelerations: absent         Uterine Assessment   Method: Method: external tocotransducer, palpation   Frequency (min):     Ctx Count in 10 min:     Duration:     Intensity: Contraction Intensity: mild by palpation       [place] Units:       GBS is negative      Assessment & Plan     Falsely      Assessment:  1.  Intrauterine pregnancy at 39w4d gestation with reactive fetal status.    2.  IUP at 39 weeks estimate gestational age with false labor      Plan:  Discharge home  Strict fetal kick  Labor precautions      Marcello Owusu MD  10/16/2024  09:47 EDT

## 2024-10-16 NOTE — NON STRESS TEST
"Obstetrical Non-stress Test Interpretation     Name:  Jazmin Mae Heimlich  MRN: [id number]    19 y.o. female  at 39w3d    Indication: back pain, mucus discharge      Fetal Assessment  Fetal Movement: active  Fetal HR Assessment Method: external  Fetal HR (beats/min): 145  Fetal HR Baseline: normal range  Fetal HR Variability: moderate (amplitude range 6 to 25 bpm)  Fetal HR Accelerations: episodic, greater than/equal to 15 bpm, lasting at least 15 seconds  Fetal HR Decelerations: absent  Sinusoidal Pattern Present: absent  Fetal HR Tracing Category: Category I    /61 (BP Location: Right arm, Patient Position: Sitting)   Pulse 117   Temp 98.1 °F (36.7 °C) (Oral)   Resp 18   Ht 165.1 cm (65\")   Wt 89.4 kg (197 lb)   LMP 2024 (Exact Date) Comment: PT STATES SHE IS PREGNANT PER HOMETEST  SpO2 100%   BMI 32.78 kg/m²     Reason for test:  back pain, mucus discharge  Date of Test: 10/15/2024  Time frame of test:   RN NST Interpretation:  reactive for gestational age.      Acacia Loera RN  10/15/2024  22:16 EDT  "

## 2024-10-17 ENCOUNTER — ANESTHESIA (OUTPATIENT)
Dept: LABOR AND DELIVERY | Facility: HOSPITAL | Age: 19
End: 2024-10-17
Payer: COMMERCIAL

## 2024-10-17 ENCOUNTER — ANESTHESIA EVENT (OUTPATIENT)
Dept: LABOR AND DELIVERY | Facility: HOSPITAL | Age: 19
End: 2024-10-17
Payer: COMMERCIAL

## 2024-10-17 PROBLEM — Z37.9 NORMAL LABOR: Status: ACTIVE | Noted: 2024-10-17

## 2024-10-17 LAB
A1 MICROGLOB PLACENTAL VAG QL: POSITIVE
ABO GROUP BLD: NORMAL
ABO GROUP BLD: NORMAL
AMPHET+METHAMPHET UR QL: NEGATIVE
AMPHETAMINES UR QL: NEGATIVE
ATMOSPHERIC PRESS: 750.7 MMHG
ATMOSPHERIC PRESS: 751.8 MMHG
BARBITURATES UR QL SCN: NEGATIVE
BASE EXCESS BLDCOA CALC-SCNC: -3.8 MMOL/L (ref -2–2)
BASE EXCESS BLDCOV CALC-SCNC: -3.1 MMOL/L (ref -30–30)
BENZODIAZ UR QL SCN: NEGATIVE
BLD GP AB SCN SERPL QL: NEGATIVE
BLD GP AB SCN SERPL QL: NEGATIVE
BUPRENORPHINE SERPL-MCNC: NEGATIVE NG/ML
CANNABINOIDS SERPL QL: NEGATIVE
COCAINE UR QL: NEGATIVE
DEPRECATED RDW RBC AUTO: 43.7 FL (ref 37–54)
ERYTHROCYTE [DISTWIDTH] IN BLOOD BY AUTOMATED COUNT: 14.3 % (ref 12.3–15.4)
FENTANYL UR-MCNC: NEGATIVE NG/ML
FETAL BLEED: NEGATIVE
HCO3 BLDCOA-SCNC: 25.7 MMOL/L
HCO3 BLDCOV-SCNC: 21.9 MMOL/L
HCT VFR BLD AUTO: 29.6 % (ref 34–46.6)
HGB BLD-MCNC: 10 G/DL (ref 12–15.9)
MCH RBC QN AUTO: 28.8 PG (ref 26.6–33)
MCHC RBC AUTO-ENTMCNC: 33.8 G/DL (ref 31.5–35.7)
MCV RBC AUTO: 85.3 FL (ref 79–97)
METHADONE UR QL SCN: NEGATIVE
NUMBER OF DOSES: ABNORMAL
OPIATES UR QL: NEGATIVE
OXYCODONE UR QL SCN: NEGATIVE
PCO2 BLDCOA: 65.6 MMHG (ref 33–49)
PCO2 BLDCOV: 38.3 MM HG (ref 35–51.3)
PCP UR QL SCN: NEGATIVE
PH BLDCOA: 7.2 PH UNITS (ref 7.18–7.34)
PH BLDCOV: 7.37 PH UNITS (ref 7.26–7.4)
PLATELET # BLD AUTO: 214 10*3/MM3 (ref 140–450)
PMV BLD AUTO: 9.6 FL (ref 6–12)
PO2 BLDCOA: 15.5 MMHG
PO2 BLDCOV: 39 MM HG (ref 19–39)
RBC # BLD AUTO: 3.47 10*6/MM3 (ref 3.77–5.28)
RH BLD: NEGATIVE
RH BLD: NEGATIVE
SAO2 % BLDCOA: 13.9 %
SAO2 % BLDCOV: 72 %
T&S EXPIRATION DATE: NORMAL
TREPONEMA PALLIDUM IGG+IGM AB [PRESENCE] IN SERUM OR PLASMA BY IMMUNOASSAY: NORMAL
TRICYCLICS UR QL SCN: NEGATIVE
WBC NRBC COR # BLD AUTO: 18.15 10*3/MM3 (ref 3.4–10.8)

## 2024-10-17 PROCEDURE — 88307 TISSUE EXAM BY PATHOLOGIST: CPT | Performed by: OBSTETRICS & GYNECOLOGY

## 2024-10-17 PROCEDURE — 25010000002 OXYTOCIN PER 10 UNITS: Performed by: OBSTETRICS & GYNECOLOGY

## 2024-10-17 PROCEDURE — 86901 BLOOD TYPING SEROLOGIC RH(D): CPT | Performed by: OBSTETRICS & GYNECOLOGY

## 2024-10-17 PROCEDURE — 25010000002 LIDOCAINE PF 2% 2 % SOLUTION: Performed by: REGISTERED NURSE

## 2024-10-17 PROCEDURE — 51702 INSERT TEMP BLADDER CATH: CPT

## 2024-10-17 PROCEDURE — 3E0234Z INTRODUCTION OF SERUM, TOXOID AND VACCINE INTO MUSCLE, PERCUTANEOUS APPROACH: ICD-10-PCS | Performed by: OBSTETRICS & GYNECOLOGY

## 2024-10-17 PROCEDURE — 85461 HEMOGLOBIN FETAL: CPT | Performed by: OBSTETRICS & GYNECOLOGY

## 2024-10-17 PROCEDURE — 25810000003 SODIUM CHLORIDE 0.9 % SOLUTION: Performed by: OBSTETRICS & GYNECOLOGY

## 2024-10-17 PROCEDURE — 25810000003 LACTATED RINGERS SOLUTION: Performed by: OBSTETRICS & GYNECOLOGY

## 2024-10-17 PROCEDURE — 86900 BLOOD TYPING SEROLOGIC ABO: CPT | Performed by: OBSTETRICS & GYNECOLOGY

## 2024-10-17 PROCEDURE — C1755 CATHETER, INTRASPINAL: HCPCS | Performed by: REGISTERED NURSE

## 2024-10-17 PROCEDURE — 85027 COMPLETE CBC AUTOMATED: CPT | Performed by: OBSTETRICS & GYNECOLOGY

## 2024-10-17 PROCEDURE — 86850 RBC ANTIBODY SCREEN: CPT | Performed by: OBSTETRICS & GYNECOLOGY

## 2024-10-17 PROCEDURE — 86780 TREPONEMA PALLIDUM: CPT | Performed by: OBSTETRICS & GYNECOLOGY

## 2024-10-17 PROCEDURE — 25010000002 FENTANYL CITRATE (PF) 50 MCG/ML SOLUTION: Performed by: REGISTERED NURSE

## 2024-10-17 PROCEDURE — 25810000003 LACTATED RINGERS PER 1000 ML: Performed by: OBSTETRICS & GYNECOLOGY

## 2024-10-17 PROCEDURE — 25010000002 RHO D IMMUNE GLOBULIN 1500 UNIT/2ML SOLUTION PREFILLED SYRINGE: Performed by: OBSTETRICS & GYNECOLOGY

## 2024-10-17 PROCEDURE — 82803 BLOOD GASES ANY COMBINATION: CPT

## 2024-10-17 RX ORDER — CALCIUM CARBONATE 500 MG/1
2 TABLET, CHEWABLE ORAL 3 TIMES DAILY PRN
Status: DISCONTINUED | OUTPATIENT
Start: 2024-10-17 | End: 2024-10-18 | Stop reason: HOSPADM

## 2024-10-17 RX ORDER — OXYTOCIN/0.9 % SODIUM CHLORIDE 30/500 ML
999 PLASTIC BAG, INJECTION (ML) INTRAVENOUS ONCE
Status: COMPLETED | OUTPATIENT
Start: 2024-10-17 | End: 2024-10-17

## 2024-10-17 RX ORDER — FENTANYL/ROPIVACAINE/NS/PF 2MCG/ML-.2
PLASTIC BAG, INJECTION (ML) INJECTION
Status: COMPLETED
Start: 2024-10-17 | End: 2024-10-17

## 2024-10-17 RX ORDER — CARBOPROST TROMETHAMINE 250 UG/ML
250 INJECTION, SOLUTION INTRAMUSCULAR
Status: DISCONTINUED | OUTPATIENT
Start: 2024-10-17 | End: 2024-10-17 | Stop reason: HOSPADM

## 2024-10-17 RX ORDER — OXYTOCIN/0.9 % SODIUM CHLORIDE 30/500 ML
250 PLASTIC BAG, INJECTION (ML) INTRAVENOUS CONTINUOUS
Status: ACTIVE | OUTPATIENT
Start: 2024-10-17 | End: 2024-10-17

## 2024-10-17 RX ORDER — ACETAMINOPHEN 325 MG/1
650 TABLET ORAL EVERY 6 HOURS
Status: DISCONTINUED | OUTPATIENT
Start: 2024-10-17 | End: 2024-10-18 | Stop reason: HOSPADM

## 2024-10-17 RX ORDER — HYDROCODONE BITARTRATE AND ACETAMINOPHEN 5; 325 MG/1; MG/1
1 TABLET ORAL EVERY 4 HOURS PRN
Status: DISCONTINUED | OUTPATIENT
Start: 2024-10-17 | End: 2024-10-18 | Stop reason: HOSPADM

## 2024-10-17 RX ORDER — IBUPROFEN 600 MG/1
600 TABLET, FILM COATED ORAL EVERY 6 HOURS SCHEDULED
Status: DISCONTINUED | OUTPATIENT
Start: 2024-10-17 | End: 2024-10-17

## 2024-10-17 RX ORDER — MISOPROSTOL 200 UG/1
TABLET ORAL
Status: DISCONTINUED
Start: 2024-10-17 | End: 2024-10-17 | Stop reason: WASHOUT

## 2024-10-17 RX ORDER — DOCUSATE SODIUM 100 MG/1
100 CAPSULE, LIQUID FILLED ORAL 2 TIMES DAILY
Status: DISCONTINUED | OUTPATIENT
Start: 2024-10-17 | End: 2024-10-18 | Stop reason: HOSPADM

## 2024-10-17 RX ORDER — FENTANYL CITRATE 50 UG/ML
INJECTION, SOLUTION INTRAMUSCULAR; INTRAVENOUS
Status: COMPLETED | OUTPATIENT
Start: 2024-10-17 | End: 2024-10-17

## 2024-10-17 RX ORDER — IBUPROFEN 600 MG/1
600 TABLET, FILM COATED ORAL EVERY 6 HOURS
Status: DISCONTINUED | OUTPATIENT
Start: 2024-10-17 | End: 2024-10-18 | Stop reason: HOSPADM

## 2024-10-17 RX ORDER — HYDROCODONE BITARTRATE AND ACETAMINOPHEN 10; 325 MG/1; MG/1
1 TABLET ORAL EVERY 4 HOURS PRN
Status: DISCONTINUED | OUTPATIENT
Start: 2024-10-17 | End: 2024-10-18 | Stop reason: HOSPADM

## 2024-10-17 RX ORDER — OXYTOCIN/0.9 % SODIUM CHLORIDE 30/500 ML
PLASTIC BAG, INJECTION (ML) INTRAVENOUS
Status: DISPENSED
Start: 2024-10-17 | End: 2024-10-17

## 2024-10-17 RX ORDER — MISOPROSTOL 200 UG/1
200 TABLET ORAL EVERY 4 HOURS PRN
Status: DISCONTINUED | OUTPATIENT
Start: 2024-10-17 | End: 2024-10-18 | Stop reason: HOSPADM

## 2024-10-17 RX ORDER — LIDOCAINE HYDROCHLORIDE 20 MG/ML
INJECTION, SOLUTION EPIDURAL; INFILTRATION; INTRACAUDAL; PERINEURAL
Status: COMPLETED
Start: 2024-10-17 | End: 2024-10-17

## 2024-10-17 RX ORDER — BISACODYL 10 MG
10 SUPPOSITORY, RECTAL RECTAL DAILY PRN
Status: DISCONTINUED | OUTPATIENT
Start: 2024-10-18 | End: 2024-10-18 | Stop reason: HOSPADM

## 2024-10-17 RX ORDER — FAMOTIDINE 20 MG/1
20 TABLET, FILM COATED ORAL
Status: DISCONTINUED | OUTPATIENT
Start: 2024-10-17 | End: 2024-10-18 | Stop reason: HOSPADM

## 2024-10-17 RX ORDER — SODIUM CHLORIDE 0.9 % (FLUSH) 0.9 %
1-10 SYRINGE (ML) INJECTION AS NEEDED
Status: DISCONTINUED | OUTPATIENT
Start: 2024-10-17 | End: 2024-10-18 | Stop reason: HOSPADM

## 2024-10-17 RX ORDER — ONDANSETRON 2 MG/ML
4 INJECTION INTRAMUSCULAR; INTRAVENOUS ONCE AS NEEDED
Status: DISCONTINUED | OUTPATIENT
Start: 2024-10-17 | End: 2024-10-17 | Stop reason: HOSPADM

## 2024-10-17 RX ORDER — CARBOPROST TROMETHAMINE 250 UG/ML
250 INJECTION, SOLUTION INTRAMUSCULAR ONCE AS NEEDED
Status: DISCONTINUED | OUTPATIENT
Start: 2024-10-17 | End: 2024-10-18 | Stop reason: HOSPADM

## 2024-10-17 RX ORDER — OXYTOCIN/0.9 % SODIUM CHLORIDE 30/500 ML
125 PLASTIC BAG, INJECTION (ML) INTRAVENOUS ONCE AS NEEDED
Status: DISCONTINUED | OUTPATIENT
Start: 2024-10-17 | End: 2024-10-18 | Stop reason: HOSPADM

## 2024-10-17 RX ORDER — METHYLERGONOVINE MALEATE 0.2 MG/ML
200 INJECTION INTRAVENOUS ONCE AS NEEDED
Status: DISCONTINUED | OUTPATIENT
Start: 2024-10-17 | End: 2024-10-17 | Stop reason: HOSPADM

## 2024-10-17 RX ORDER — FENTANYL CITRATE 50 UG/ML
INJECTION, SOLUTION INTRAMUSCULAR; INTRAVENOUS
Status: COMPLETED
Start: 2024-10-17 | End: 2024-10-17

## 2024-10-17 RX ORDER — LIDOCAINE HYDROCHLORIDE 20 MG/ML
INJECTION, SOLUTION EPIDURAL; INFILTRATION; INTRACAUDAL; PERINEURAL
Status: COMPLETED | OUTPATIENT
Start: 2024-10-17 | End: 2024-10-17

## 2024-10-17 RX ORDER — METHYLERGONOVINE MALEATE 0.2 MG/ML
200 INJECTION INTRAVENOUS ONCE AS NEEDED
Status: DISCONTINUED | OUTPATIENT
Start: 2024-10-17 | End: 2024-10-18 | Stop reason: HOSPADM

## 2024-10-17 RX ORDER — LIDOCAINE HYDROCHLORIDE AND EPINEPHRINE 15; 5 MG/ML; UG/ML
INJECTION, SOLUTION EPIDURAL
Status: COMPLETED | OUTPATIENT
Start: 2024-10-17 | End: 2024-10-17

## 2024-10-17 RX ORDER — EPHEDRINE SULFATE 50 MG/ML
5 INJECTION, SOLUTION INTRAVENOUS
Status: DISCONTINUED | OUTPATIENT
Start: 2024-10-17 | End: 2024-10-17 | Stop reason: HOSPADM

## 2024-10-17 RX ORDER — ONDANSETRON 4 MG/1
8 TABLET, ORALLY DISINTEGRATING ORAL EVERY 6 HOURS PRN
Status: DISCONTINUED | OUTPATIENT
Start: 2024-10-17 | End: 2024-10-18 | Stop reason: HOSPADM

## 2024-10-17 RX ORDER — FAMOTIDINE 20 MG/1
20 TABLET, FILM COATED ORAL
Status: DISCONTINUED | OUTPATIENT
Start: 2024-10-17 | End: 2024-10-17

## 2024-10-17 RX ORDER — MISOPROSTOL 200 UG/1
800 TABLET ORAL ONCE AS NEEDED
Status: DISCONTINUED | OUTPATIENT
Start: 2024-10-17 | End: 2024-10-17 | Stop reason: HOSPADM

## 2024-10-17 RX ORDER — LIDOCAINE HCL/EPINEPHRINE/PF 2%-1:200K
VIAL (ML) INJECTION
Status: DISPENSED
Start: 2024-10-17 | End: 2024-10-17

## 2024-10-17 RX ADMIN — SODIUM CHLORIDE, POTASSIUM CHLORIDE, SODIUM LACTATE AND CALCIUM CHLORIDE 1000 ML: 600; 310; 30; 20 INJECTION, SOLUTION INTRAVENOUS at 00:23

## 2024-10-17 RX ADMIN — WITCH HAZEL: 500 SOLUTION RECTAL; TOPICAL at 06:13

## 2024-10-17 RX ADMIN — LIDOCAINE HYDROCHLORIDE AND EPINEPHRINE 2 ML: 15; 5 INJECTION, SOLUTION EPIDURAL at 00:50

## 2024-10-17 RX ADMIN — Medication: at 06:13

## 2024-10-17 RX ADMIN — FAMOTIDINE 20 MG: 20 TABLET ORAL at 17:59

## 2024-10-17 RX ADMIN — LIDOCAINE HYDROCHLORIDE AND EPINEPHRINE 3 ML: 15; 5 INJECTION, SOLUTION EPIDURAL at 00:45

## 2024-10-17 RX ADMIN — Medication 10 ML/HR: at 00:51

## 2024-10-17 RX ADMIN — ACETAMINOPHEN 650 MG: 325 TABLET ORAL at 12:41

## 2024-10-17 RX ADMIN — OXYTOCIN 999 ML/HR: 10 INJECTION, SOLUTION INTRAMUSCULAR; INTRAVENOUS at 02:47

## 2024-10-17 RX ADMIN — IBUPROFEN 600 MG: 600 TABLET, FILM COATED ORAL at 08:51

## 2024-10-17 RX ADMIN — FENTANYL CITRATE 100 MCG: 50 INJECTION, SOLUTION INTRAMUSCULAR; INTRAVENOUS at 00:50

## 2024-10-17 RX ADMIN — IBUPROFEN 600 MG: 600 TABLET, FILM COATED ORAL at 15:11

## 2024-10-17 RX ADMIN — LIDOCAINE HYDROCHLORIDE 5 ML: 20 INJECTION, SOLUTION EPIDURAL; INFILTRATION; INTRACAUDAL; PERINEURAL at 00:50

## 2024-10-17 RX ADMIN — DOCUSATE SODIUM 100 MG: 100 CAPSULE, LIQUID FILLED ORAL at 08:51

## 2024-10-17 RX ADMIN — SODIUM CHLORIDE, POTASSIUM CHLORIDE, SODIUM LACTATE AND CALCIUM CHLORIDE 30 ML/HR: 600; 310; 30; 20 INJECTION, SOLUTION INTRAVENOUS at 01:59

## 2024-10-17 RX ADMIN — ACETAMINOPHEN 650 MG: 325 TABLET ORAL at 17:59

## 2024-10-17 RX ADMIN — FAMOTIDINE 20 MG: 20 TABLET ORAL at 01:59

## 2024-10-17 RX ADMIN — HUMAN RHO(D) IMMUNE GLOBULIN 1500 UNITS: 1500 SOLUTION INTRAMUSCULAR; INTRAVENOUS at 18:00

## 2024-10-17 RX ADMIN — MINERAL OIL 30 ML: 1000 SOLUTION ORAL at 02:54

## 2024-10-17 NOTE — ANESTHESIA PREPROCEDURE EVALUATION
" Anesthesia Evaluation     Patient summary reviewed and Nursing notes reviewed   no history of anesthetic complications:                Airway   Mallampati: II  TM distance: >3 FB  Dental          Pulmonary - normal exam   (+) asthma,  Cardiovascular - negative cardio ROS and normal exam  Exercise tolerance: good (4-7 METS)        Neuro/Psych- negative ROS  GI/Hepatic/Renal/Endo    (+) GERD well controlled    Musculoskeletal (-) negative ROS    Abdominal    Substance History - negative use     OB/GYN    (+) Pregnant, pregnancy induced hypertension        Other - negative ROS       ROS/Med Hx Other:                      Anesthesia Plan    ASA 2     epidural     (Patient crying in pain 10/10. Difficult to get history. She keeps repeating \"I can't\" )    Anesthetic plan, risks, benefits, and alternatives have been provided, discussed and informed consent has been obtained with: patient.        CODE STATUS:    Level Of Support Discussed With: Patient  Code Status (Patient has no pulse and is not breathing): CPR (Attempt to Resuscitate)  Medical Interventions (Patient has pulse or is breathing): Full Support      "

## 2024-10-17 NOTE — LACTATION NOTE
LC in to see this p2 patient. She states it took 5 days for her milk to come in with her first. She has only been feeding infant formula at this point. She states she has attempted to latch her but her nipples are too short. LC provided nipple shield and encouraged pumping to promote better lactation success with this baby. CHRIS instructed her on hospital pump and storage of milk and normal expectations of pumping in the first few days after delivery.

## 2024-10-17 NOTE — L&D DELIVERY NOTE
[facility]  Vaginal Delivery Note    Delivery     Delivery: Vaginal, Spontaneous    YOB: 2025   Time of Birth:  Gestational Age 2:42 AM  39w5d     Anesthesia: Epidural    Delivering clinician: Eveline Dyer   Forceps?   No   Vacuum? No    Shoulder dystocia present: No        Delivery narrative: Patient became complete and 1+ station. She had a forebag which I ruptured and there was clear fluid. She pushed and delivered the head. Mouth and nares suctioned on perineum. She then delivered shoulders without dystocia. Rest of baby delivered onto bed. Baby was dried with towel. Delayed cord clamping performed. Baby was placed on mom's abdomen and looked great except for color. Cord gases and blood were obtained. Pitocin was started. She then delivered the placenta. The placenta was a grade 3 and appeared very old. Placenta sent to  pathology. She had no lacerations/tears.     Infant    Findings: female infant     Infant observations: Weight: 2920 g (6 lb 7 oz)  Length: 19 in  Observations/Comments:        Apgars: 8  @ 1 minute /    8  @ 5 minutes         Placenta, Cord, and Fluid    Placenta delivered  Spontaneous at   10/17/2024  2:53 AM    Cord: 3 vessels present.   Nuchal Cord?  no   Cord blood obtained: Yes   Cord gases obtained:  Yes   Cord gas results: Venous:    pH, Cord Venous   Date Value Ref Range Status   10/17/2024 7.366 7.260 - 7.400 pH Units Final     Base Excess, Cord Venous   Date Value Ref Range Status   10/17/2024 -3.1 -30.0 - 30.0 mmol/L Final     Comment:     Serial Number: [id number]:  [id number]       Arterial:    pH, Cord Arterial   Date Value Ref Range Status   10/17/2024 7.20 7.18 - 7.34 pH Units Final     Base Exc, Cord Arterial   Date Value Ref Range Status   10/17/2024 -3.8 (L) -2.0 - 2.0 mmol/L Final     Comment:     Serial Number: [id number]:  [id number]        Repair    Episiotomy: None    No    Lacerations: No   Estimated Blood Loss: Est. Blood Loss (mL): 250 mL (Filed  from Delivery Summary) (10/17/24 4740)     Complications  none    Disposition  Mother to Mother Baby/Postpartum  in stable condition currently.  Baby to remains with mom in stable condition currently.    Electronically signed by Eveline Dyer MD, 10/17/24, 3:14 AM EDT.

## 2024-10-17 NOTE — PLAN OF CARE
Goal Outcome Evaluation:  Plan of Care Reviewed With: patient        Progress: improving  Outcome Evaluation: Patient received in PP period.  FF -1, scant bleeding.  Perineum intact.  Scheduled Tylenol and Motrin for pain.  Up ad maninder, performs self care.  Has been alone most of the day.  Handles infant well, provides all infant care.

## 2024-10-17 NOTE — PLAN OF CARE
Problem: Adult Inpatient Plan of Care  Goal: Plan of Care Review  Outcome: Progressing  Goal: Patient-Specific Goal (Individualized)  Outcome: Progressing  Goal: Absence of Hospital-Acquired Illness or Injury  Outcome: Progressing  Goal: Optimal Comfort and Wellbeing  Outcome: Progressing  Goal: Readiness for Transition of Care  Outcome: Progressing  Intervention: Mutually Develop Transition Plan  Recent Flowsheet Documentation  Taken 10/17/2024 0022 by Padmini Mancuso RNA  Equipment Currently Used at Home: none  Taken 10/17/2024 0021 by Padmini Mancuso RNA  Equipment Needed After Discharge: none  Equipment Currently Used at Home: none  Anticipated Changes Related to Illness: none  Transportation Anticipated: family or friend will provide  Transportation Concerns: none  Concerns to be Addressed: no discharge needs identified  Readmission Within the Last 30 Days: no previous admission in last 30 days  Patient/Family Anticipated Services at Transition: none  Patient/Family Anticipates Transition to: home with family     Problem: Postpartum (Vaginal Delivery)  Goal: Successful Parent Role Transition  Outcome: Progressing  Goal: Hemostasis  Outcome: Progressing  Goal: Absence of Infection Signs and Symptoms  Outcome: Progressing  Goal: Anesthesia/Sedation Recovery  Outcome: Progressing  Goal: Optimal Pain Control and Function  Outcome: Progressing  Goal: Effective Urinary Elimination  Outcome: Progressing   Goal Outcome Evaluation:

## 2024-10-17 NOTE — NURSING NOTE
39w4d presents with reports of contractions and possible leaking all day today. Two patient id verified, efm and uc toco applied abdomen palpates soft and non tender, fetal movement noted. Denies vaginal bleeding.

## 2024-10-17 NOTE — ANESTHESIA PROCEDURE NOTES
Labor Epidural    Pre-sedation assessment completed: 10/17/2024 12:33 AM    Patient reassessed immediately prior to procedure    Patient location during procedure: OB  Start Time: 10/17/2024 12:40 AM  Stop Time: 10/17/2024 12:51 AM  Performed By  CRNA/CAA: Whit Lopez CRNA  Preanesthetic Checklist  Completed: patient identified, IV checked, risks and benefits discussed, surgical consent, monitors and equipment checked, pre-op evaluation and timeout performed  Additional Notes  Patient difficult to get in to position. She does not want to move due to pain. She did tell me she has scoliosis and after last epidural she had paresthesia that lasted 4 weeks.    Once patient positioned epidural placed with ease. No redirecting.  Prep:  Pt Position:sitting  Sterile Tech:cap, gloves, sterile barrier, mask and gown  Prep:povidone-iodine 7.5% surgical scrub  Monitoring:blood pressure monitoring, continuous pulse oximetry and EKG  Epidural Block Procedure:  Approach:midline  Guidance:landmark technique and palpation technique  Location:L3-L4  Needle Type:Tuohy  Needle Gauge:17 G  Loss of Resistance Medium: air  Loss of Resistance: 8cm  Cath Depth at skin:13 cm  Paresthesia: none  Aspiration:negative  Test Dose:negative  Medication: lidocaine 1.5%-EPINEPHrine 1:200,000 (XYLOCAINE W/EPI) injection - Epidural   3 mL - 10/17/2024 12:45:00 AM   2 mL - 10/17/2024 12:50:00 AM  lidocaine PF 2% (XYLOCAINE) injection - Epidural   5 mL - 10/17/2024 12:50:00 AM  fentaNYL citrate (PF) (SUBLIMAZE) injection - Epidural   100 mcg - 10/17/2024 12:50:00 AM  Number of Attempts: 1  Post Assessment:  Dressing:occlusive dressing applied and secured with tape  Pt Tolerance:patient tolerated the procedure well with no apparent complications  Complications:no

## 2024-10-17 NOTE — H&P
MEME Dumont  Obstetric History and Physical    Chief Complaint   Patient presents with    Contractions       Subjective     HPI:    Patient is a 19 y.o. female  currently at 39w5d, who presents to OB ED with/for labor and leaking fluid since 8am.    She has been seeing [facility] [place] for her prenatal care.  The pregnancy has been complicated by the following problems:    Patient Active Problem List   Diagnosis    Supervision of other normal pregnancy, antepartum    History of gestational hypertension    Rh negative status during pregnancy in third trimester    Asthma affecting pregnancy, antepartum    Anemia    Chlamydia infection during pregnancy    Normal labor       The following portions of the patients history were reviewed and updated as appropriate:   current medications, allergies, past medical history, past surgical history, past family history, past social history and current problem list.     Prenatal Information:  Prenatal Results       Initial Prenatal Labs       Test Value Reference Range Date Time    Hemoglobin  10.2 g/dL 12.0 - 15.9 24 1548    Hematocrit  31.6 % 34.0 - 46.6 24 1548    Platelets  214 10*3/mm3 140 - 450 24 1548    Rubella IgG  1.94 index Immune >0.99 24 1548    Hepatitis B SAg  Non-Reactive  Non-Reactive 24 1548    Hepatitis C Ab  Non-Reactive  Non-Reactive 24 1548    RPR        T. Pallidum Ab   Non-Reactive  Non-Reactive 10/17/24 0010       Non-Reactive  Non-Reactive 24 1117       Non-Reactive  Non-Reactive 24 1548    ABO  A   10/17/24 0010    Rh  Negative   10/17/24 0010    HIV  Non-Reactive  Non-Reactive 24 1548    Urine Culture  No growth   24 1546    Gonorrhea  Not Detected  Not Detected  24 0848       Not Detected  Not Detected  24 1055       Not Detected  Not Detected  24 1546    Chlamydia  Not Detected  Not Detected  24 0848       Not Detected  Not Detected  24 1055       Detected  Not Detected   04/25/24 1546    Hemoglobinopathy Fractionation  Comment   10/19/21 1135                2nd and 3rd Trimester       Test Value Reference Range Date Time    Hemoglobin (repeated)  10.0 g/dL 12.0 - 15.9 10/17/24 0010       10.4 g/dL 12.0 - 15.9 09/24/24 0900       10.9 g/dL 12.0 - 15.9 07/29/24 1108       10.6 g/dL 12.0 - 15.9 06/19/24 1141    Hematocrit (repeated)  29.6 % 34.0 - 46.6 10/17/24 0010       31.4 % 34.0 - 46.6 09/24/24 0900       33.4 % 34.0 - 46.6 07/29/24 1108       32.7 % 34.0 - 46.6 06/19/24 1141    Platelets   214 10*3/mm3 140 - 450 10/17/24 0010       212 10*3/mm3 140 - 450 09/24/24 0900       201 10*3/mm3 140 - 450 07/29/24 1108       195 10*3/mm3 140 - 450 06/19/24 1141       214 10*3/mm3 140 - 450 04/25/24 1548    1 hour GTT   101 mg/dL 65 - 139 07/29/24 1108    Antibody Screen (repeated)  Negative   10/17/24 0010       Negative   07/29/24 1108    3rd TM syphilis scrn (repeated) TP-Ab  Non-Reactive  Non-Reactive 10/17/24 0010       Non-Reactive  Non-Reactive 08/27/24 1117    3rd TM syphilis screen TB-Ab (FTA)  Non-Reactive  Non-Reactive 10/17/24 0010       Non-Reactive  Non-Reactive 08/27/24 1117    Group B Strep  Negative  Negative 09/24/24 0848                Drug Screening       Test Value Reference Range Date Time    Amphetamine Screen  Negative  Negative 10/16/24 2348    Barbiturate Screen  Negative  Negative 10/16/24 2348       Negative  Negative 04/25/24 1546    Benzodiazepine Screen  Negative  Negative 10/16/24 2348       Negative  Negative 04/25/24 1546    Methadone Screen  Negative  Negative 10/16/24 2348       Negative  Negative 04/25/24 1546    Phencyclidine Screen  Negative  Negative 10/16/24 2348    Opiates Screen  Negative  Negative 10/16/24 2348       Negative  Negative 04/25/24 1546    THC Screen  Negative  Negative 10/16/24 2348       Positive  Negative 04/25/24 1546    Cocaine Screen  Negative  Negative 10/16/24 2348       Negative  Negative 04/25/24 1546    Propoxyphene  Screen        Buprenorphine Screen  Negative  Negative 10/16/24 2348    Methamphetamine Screen  Negative  Negative 10/16/24 2348    Oxycodone Screen  Negative  Negative 10/16/24 2348       Negative  Negative 24 1546    Tricyclic Antidepressants Screen  Negative  Negative 10/16/24 2348              Past OB History:     OB History    Para Term  AB Living   5 2 2 0 3 2   SAB IAB Ectopic Molar Multiple Live Births   2 1 0 0 0 2      # Outcome Date GA Lbr Jignesh/2nd Weight Sex Type Anes PTL Lv   5 Term 10/17/24 39w5d / 01:12 2920 g (6 lb 7 oz) F Vag-Spont EPI N ZABRINA      Name: BirdkaitysindiEricl Heimlich      Apgar1: 8  Apgar5: 8   4 SAB 23           3 IAB 2023           2 Term 22 40w0d 14:32 / 00:13 3640 g (8 lb 0.4 oz) M Vag-Spont EPI N ZABRINA      Name: HEIMLICH,JAZMINSBOY      Apgar1: 8  Apgar5: 9   1 SAB                Past Medical History: Past Medical History:   Diagnosis Date    Anemia     Chlamydia infection during pregnancy     History of gestational hypertension 2021 patient with elevated blood pressures around time of surgery.          Past Surgical History Past Surgical History:   Procedure Laterality Date    CHOLECYSTECTOMY N/A 2021    Procedure: CHOLECYSTECTOMY LAPAROSCOPIC;  Surgeon: Rasheed Morley MD;  Location: [facility] OR;  Service: General;  Laterality: N/A;      Family History: Family History   Problem Relation Age of Onset    Stroke Father     Mental illness Mother     Drug abuse Mother     Alcohol abuse Mother     COPD Maternal Grandmother     Hypertension Maternal Grandmother       Social History:  reports that she has never smoked. She has been exposed to tobacco smoke. She has never used smokeless tobacco.   reports no history of alcohol use.   reports no history of drug use.        General ROS: Pertinent items are noted in HPI  Home Medications:  Prenatal 27-1, Prenatal MV & Min w/FA-DHA, albuterol sulfate HFA, famotidine, and ferrous  sulfate    Allergies:  Allergies   Allergen Reactions    Penicillins Rash    Robamox [Amoxicillin] Rash       Objective       Vital Signs Range for the last 24 hours  Temperature: Temp:  [97.8 °F (36.6 °C)] 97.8 °F (36.6 °C)   Temp Source: Temp src: Oral   BP: BP: ()/(49-83) 100/83   Pulse: Heart Rate:  [] 93   Respirations: Resp:  [18] 18   SPO2: SpO2:  [98 %-100 %] 100 %     Physical Examination:   General appearance - alert, well appearing, and in no distress  Mental status - alert, oriented to person, place, and time  Chest - normal effort  Heart - normal rate  Abdomen - soft, nondistended; no rebound or guarding  Pelvic - 4/90/-3 on admission  Neurological - alert, oriented, normal speech  Extremities - Edema none  Skin - normal coloration and turgor, no suspicious skin lesions noted    Cervix: Method: sterile vaginal exam performed (Dr Dyer)   Dilation: Cervical Dilation (cm): 4   Effacement: Cervical Effacement: 90   Station:         Fetal Heart Rate Assessment :      Beats/min: Fetal HR (beats/min): 125   Baseline: Fetal HR Baseline: normal range   Variability: Fetal HR Variability: moderate (amplitude range 6 to 25 bpm)   Accels: Fetal HR Accelerations: episodic, greater than/equal to 15 bpm, lasting at least 15 seconds   Decels: Fetal HR Decelerations: variable   Tracing Category:       Uterine Assessment   Method: Method: external tocotransducer       Lab Results (last 24 hours)       Procedure Component Value Units Date/Time    Urine Drug Screen - Urine, Clean Catch [[id number]]  (Normal) Collected: 10/16/24 2348    Specimen: Urine, Clean Catch Updated: 10/17/24 0019     THC, Screen, Urine Negative     Phencyclidine (PCP), Urine Negative     Cocaine Screen, Urine Negative     Methamphetamine, Ur Negative     Opiate Screen Negative     Amphetamine Screen, Urine Negative     Benzodiazepine Screen, Urine Negative     Tricyclic Antidepressants Screen Negative     Methadone Screen, Urine Negative      Barbiturates Screen, Urine Negative     Oxycodone Screen, Urine Negative     Buprenorphine, Screen, Urine Negative    Narrative:      Cutoff For Drugs Screened:    Amphetamines               500 ng/ml  Barbiturates               200 ng/ml  Benzodiazepines            150 ng/ml  Cocaine                    150 ng/ml  Methadone                  200 ng/ml  Opiates                    100 ng/ml  Phencyclidine               25 ng/ml  THC                         50 ng/ml  Methamphetamine            500 ng/ml  Tricyclic Antidepressants  300 ng/ml  Oxycodone                  100 ng/ml  Buprenorphine               10 ng/ml    The normal value for all drugs tested is negative. This report includes unconfirmed screening results, with the cutoff values listed, to be used for medical treatment purposes only.  Unconfirmed results must not be used for non-medical purposes such as employment or legal testing.  Clinical consideration should be applied to any drug of abuse test, particularly when unconfirmed results are used.      Fentanyl, Urine - Urine, Clean Catch [[id number]]  (Normal) Collected: 10/16/24 2348    Specimen: Urine, Clean Catch Updated: 10/17/24 0021     Fentanyl, Urine Negative    Narrative:      Negative Threshold:      Fentanyl 5 ng/mL     The normal value for the drug tested is negative. This report includes final unconfirmed screening results to be used for medical treatment purposes only. Unconfirmed results must not be used for non-medical purposes such as employment or legal testing. Clinical consideration should be applied to any drug of abuse test, particularly when unconfirmed results are used.           Rapid Assay For ROM - Amniotic Fluid, Amniotic Sac [[id number]]  (Abnormal) Collected: 10/16/24 2351    Specimen: Amniotic Fluid from Amniotic Sac Updated: 10/17/24 0026     Rupture of Membranes Positive    Narrative:      This test detects tiny amounts of amniotic fluid and is  approved for use at any  gestational age. When there is   significant presence of blood on the swab, the test can   malfunction and is not recommended (possible false positive  results). In cases of only trace amounts of blood on the swab,  the test still functions properly and will not interfere with  the test results. In very rare cases when a sample is taken  12 hours or later after a rupture, a false negative result may  occur due to obstruction of the rupture by fetus or resealing  of the amniotic sac.    CBC (No Diff) [[id number]]  (Abnormal) Collected: 10/17/24 0010    Specimen: Blood Updated: 10/17/24 0021     WBC 18.15 10*3/mm3      RBC 3.47 10*6/mm3      Hemoglobin 10.0 g/dL      Hematocrit 29.6 %      MCV 85.3 fL      MCH 28.8 pg      MCHC 33.8 g/dL      RDW 14.3 %      RDW-SD 43.7 fl      MPV 9.6 fL      Platelets 214 10*3/mm3     Treponema pallidum AB w/Reflex RPR [[id number]]  (Normal) Collected: 10/17/24 0010    Specimen: Blood Updated: 10/17/24 0046     Treponemal AB Total Non-Reactive    Narrative:      Reactive results will reflex RPR testing.            Assessment & Plan     Assessment:  -  Intrauterine pregnancy at 39w5d gestation who presents for: contractions and leaking fluid since 8am  -  GBS status:   Group B Strep, DNA   Date Value Ref Range Status   09/24/2024 Negative Negative Final       Plan:  - Admit for labor and ROM; plan for IVF, labs, epidural prn  - Plan of care has been reviewed with patient and patient agrees.   - Risks, benefits of treatment plan have been discussed.  - All questions have been answered.        Electronically signed by Eveline Dyer MD, 10/17/24, 3:05 AM EDT.

## 2024-10-18 VITALS
DIASTOLIC BLOOD PRESSURE: 63 MMHG | SYSTOLIC BLOOD PRESSURE: 121 MMHG | RESPIRATION RATE: 18 BRPM | OXYGEN SATURATION: 100 % | TEMPERATURE: 98.2 F | WEIGHT: 197 LBS | BODY MASS INDEX: 32.82 KG/M2 | HEART RATE: 91 BPM | HEIGHT: 65 IN

## 2024-10-18 RX ADMIN — IBUPROFEN 600 MG: 600 TABLET, FILM COATED ORAL at 11:24

## 2024-10-18 RX ADMIN — IBUPROFEN 600 MG: 600 TABLET, FILM COATED ORAL at 05:38

## 2024-10-18 RX ADMIN — ACETAMINOPHEN 650 MG: 325 TABLET ORAL at 05:38

## 2024-10-18 NOTE — LACTATION NOTE
"LC in to follow up with lactation progress. Patient continues to only formula feed through the night. She admits to no using her breastpump that was provided. She continues to states she is motivated to give baby breastmilk but has not started pumping or continued to put baby to the breast. LC discussed that the lack of stimulation by baby or pump will impact her milk supply and lactogenesis. She has requested more formula for baby and states she does have a can of formula at home. LC reminded her about WIC resources for her but states \"I am not getting WIC\". Patient is planning on discharge today. LC discussed normal infant output patterns to expect and if infant is not waking by 3 hours to wake and feed using measures shown in the hospital. LC discussed checking to make sure new medications are safe to breastfeed. LC discussed alcohol use and cigarette/second hand smoke around baby and breastfeeding and discussed the impact of street drugs on infants and breastfeeding. LC used the page in the breastfeeding guide to discuss harmful effects of these. Breastfeeding/Lactation expectations and anticipatory guidance discussed for the next two weeks . LC discussed nipple care, plugged ducts, engorgement, and breast infection. LC encouraged mom to see pediatrician two days from discharge for follow up. Patient has a breastpump for home use and LC discussed good pumping guidelines and normal expectations with pumping and storage and preparation of ebm for feedings. LC discussed breastfeeding/lactation resources including the local breastfeeding support group after discharge and when to call the doctor. Patient showed good understanding.  "

## 2024-10-18 NOTE — PROGRESS NOTES
MEME Dumont  Vaginal Delivery Progress Note    Subjective   Postpartum Day 1: Vaginal Delivery    The patient feels well.  Her pain is well controlled with nonsteroidal anti-inflammatory drugs and Tylenol.   She is ambulating well.  Patient describes her bleeding as moderate lochia.    Breastfeeding: without difficulty.    Objective     Vital Signs Range for the last 24 hours  Temperature: Temp:  [97.5 °F (36.4 °C)-98.4 °F (36.9 °C)] 98.4 °F (36.9 °C)   Temp Source: Temp src: Oral   BP: BP: (121-139)/(62-83) 121/73   Pulse: Heart Rate:  [74-95] 74   Respirations: Resp:  [16-20] 20       Physical Exam:  General:  no acute distress.  Abdomen: abdomen is soft without significant tenderness, masses, organomegaly or guarding.   Fundus: appropriate, firm, non tender, small lochia   Extremities: normal, atraumatic, no cyanosis, and trace edema.     Rubella:   Rubella Antibodies, IgG   Date Value Ref Range Status   04/25/2024 1.94 Immune >0.99 index Final     Comment:                                     Non-immune       <0.90                                  Equivocal  0.90 - 0.99                                  Immune           >0.99     Rh Status:    RH type   Date Value Ref Range Status   10/17/2024 Negative  Final     Immunizations:   Immunization History   Administered Date(s) Administered    COVID-19 (PFIZER) Purple Cap Monovalent 01/10/2022    DTaP, Unspecified 02/15/2008    Flu Vaccine Quad PF >36MO 12/14/2015, 12/29/2020    Fluzone (or Fluarix & Flulaval for VFC) >6mos 01/31/2023    HPV Quadrivalent 06/19/2015, 08/19/2015    Hep A, 2 Dose 02/19/2018, 10/02/2018    Hpv9 12/14/2015    IPV 02/15/2008    Influenza TIV (IM) 10/20/2009, 11/04/2013    MMR 02/15/2008    Meningococcal Conjugate 06/19/2015, 12/29/2020    Rho (D) Immune Globulin 01/12/2022, 03/09/2022, 12/15/2023, 07/29/2024, 10/17/2024    Tdap 06/19/2015    Varicella 02/15/2008       Assessment & Plan       Normal labor      Jazmin Mae Heimlich is Day 1   post-partum  Vaginal, Spontaneous Delivery Complications:none.      Plan:  Continue current care.  Desires d/c today  doing well      Electronically signed by Greta Zaldivar MD, 10/18/24, 8:55 AM EDT.

## 2024-10-18 NOTE — PLAN OF CARE
Problem: Adult Inpatient Plan of Care  Goal: Plan of Care Review  Outcome: Progressing  Goal: Patient-Specific Goal (Individualized)  Outcome: Progressing  Goal: Absence of Hospital-Acquired Illness or Injury  Outcome: Progressing  Intervention: Identify and Manage Fall Risk  Recent Flowsheet Documentation  Taken 10/18/2024 0538 by Acacia Rogers RN  Safety Promotion/Fall Prevention: safety round/check completed  Taken 10/18/2024 0400 by Acacia Rogers RN  Safety Promotion/Fall Prevention: safety round/check completed  Taken 10/18/2024 0200 by Acacia Rogers RN  Safety Promotion/Fall Prevention: safety round/check completed  Taken 10/18/2024 0010 by Acacia Rogers RN  Safety Promotion/Fall Prevention: safety round/check completed  Taken 10/17/2024 2200 by Acacia Rogers RN  Safety Promotion/Fall Prevention: safety round/check completed  Taken 10/17/2024 2000 by Acacia Rogers RN  Safety Promotion/Fall Prevention: safety round/check completed  Taken 10/17/2024 1915 by Acacia Rogers RN  Safety Promotion/Fall Prevention: safety round/check completed  Goal: Optimal Comfort and Wellbeing  Outcome: Progressing  Intervention: Monitor Pain and Promote Comfort  Recent Flowsheet Documentation  Taken 10/18/2024 0538 by Acacia Rogers RN  Pain Management Interventions: pain medication given  Goal: Readiness for Transition of Care  Outcome: Progressing     Problem: Postpartum (Vaginal Delivery)  Goal: Successful Parent Role Transition  Outcome: Progressing  Goal: Hemostasis  Outcome: Progressing  Goal: Absence of Infection Signs and Symptoms  Outcome: Progressing  Goal: Anesthesia/Sedation Recovery  Outcome: Progressing  Intervention: Optimize Anesthesia Recovery  Recent Flowsheet Documentation  Taken 10/18/2024 0538 by Acacia Rogers RN  Safety Promotion/Fall Prevention: safety round/check completed  Taken 10/18/2024 0400 by Acacia Rogers RN  Safety Promotion/Fall Prevention: safety round/check completed  Taken 10/18/2024  0200 by Acacia Rogers RN  Safety Promotion/Fall Prevention: safety round/check completed  Taken 10/18/2024 0010 by Acacia Rogers RN  Safety Promotion/Fall Prevention: safety round/check completed  Taken 10/17/2024 2200 by Acacia Rogers RN  Safety Promotion/Fall Prevention: safety round/check completed  Taken 10/17/2024 2000 by Acacia Rogers RN  Safety Promotion/Fall Prevention: safety round/check completed  Taken 10/17/2024 1915 by Acacia Rogers RN  Safety Promotion/Fall Prevention: safety round/check completed  Goal: Optimal Pain Control and Function  Outcome: Progressing  Intervention: Prevent or Manage Pain  Recent Flowsheet Documentation  Taken 10/18/2024 0538 by Acacia Rogers RN  Pain Management Interventions: pain medication given  Goal: Effective Urinary Elimination  Outcome: Progressing     Problem: Breastfeeding  Goal: Effective Breastfeeding  Outcome: Progressing   Goal Outcome Evaluation:

## 2024-10-18 NOTE — NURSING NOTE
Notified by patient's FOB grandmother- she had voiced concern about the patient not having finances to buy formula. Pt is already a patient of WIC, but pt states that she will not use WIC. States that she plans to breastfeed. Some formula provided to pt for discharge and Anmnarie Haney (lactation consultant) spoke with patient about pumping and bottle feeding until milk comes in.

## 2024-10-18 NOTE — ANESTHESIA POSTPROCEDURE EVALUATION
Patient: Jazmin Mae Heimlich    Procedure Summary       Date: 10/17/24 Room / Location:     Anesthesia Start: 0033 Anesthesia Stop: 0242    Procedure: LABOR ANALGESIA Diagnosis:     Scheduled Providers:  Provider: Whit Lopez CRNA    Anesthesia Type: epidural ASA Status: 2            Anesthesia Type: epidural    Vitals  Vitals Value Taken Time   /73 10/18/24 0535   Temp 36.9 °C (98.4 °F) 10/18/24 0535   Pulse 74 10/18/24 0535   Resp 20 10/18/24 0535   SpO2 100 % 10/17/24 0300           Post Anesthesia Care and Evaluation    Patient location during evaluation: bedside  Patient participation: complete - patient participated  Level of consciousness: awake  Pain score: 0  Pain management: adequate    Airway patency: patent  Anesthetic complications: No anesthetic complications  PONV Status: controlled  Cardiovascular status: acceptable and stable  Respiratory status: acceptable  Hydration status: acceptable  Post Neuraxial Block status: Motor and sensory function returned to baseline and No signs or symptoms of PDPH

## 2024-10-18 NOTE — PLAN OF CARE
Problem: Adult Inpatient Plan of Care  Goal: Plan of Care Review  Outcome: Met  Goal: Patient-Specific Goal (Individualized)  Outcome: Met  Goal: Absence of Hospital-Acquired Illness or Injury  Outcome: Met  Intervention: Identify and Manage Fall Risk  Recent Flowsheet Documentation  Taken 10/18/2024 0900 by Misa Barkley RN  Safety Promotion/Fall Prevention: safety round/check completed  Goal: Optimal Comfort and Wellbeing  Outcome: Met  Intervention: Monitor Pain and Promote Comfort  Recent Flowsheet Documentation  Taken 10/18/2024 0900 by Misa Barkley RN  Pain Management Interventions: no interventions per patient request  Intervention: Provide Person-Centered Care  Recent Flowsheet Documentation  Taken 10/18/2024 0900 by Misa Barkley RN  Trust Relationship/Rapport:   care explained   choices provided   emotional support provided   empathic listening provided   questions encouraged   thoughts/feelings acknowledged  Goal: Readiness for Transition of Care  Outcome: Met     Problem: Postpartum (Vaginal Delivery)  Goal: Successful Parent Role Transition  Outcome: Met  Goal: Hemostasis  Outcome: Met  Goal: Absence of Infection Signs and Symptoms  Outcome: Met  Goal: Anesthesia/Sedation Recovery  Outcome: Met  Intervention: Optimize Anesthesia Recovery  Recent Flowsheet Documentation  Taken 10/18/2024 0900 by Misa Barkley RN  Safety Promotion/Fall Prevention: safety round/check completed  Goal: Optimal Pain Control and Function  Outcome: Met  Intervention: Prevent or Manage Pain  Recent Flowsheet Documentation  Taken 10/18/2024 0900 by Misa Barkley RN  Pain Management Interventions: no interventions per patient request  Goal: Effective Urinary Elimination  Outcome: Met   Goal Outcome Evaluation:                                            Problem: Breastfeeding  Goal: Effective Breastfeeding  Outcome: Unable to Meet

## 2024-10-21 NOTE — DISCHARGE SUMMARY
"[facility]  Delivery Discharge Summary    Patient Name: Jazmin Mae Heimlich  : 2005  MRN: [id number]    Date of Admission: 10/16/2024  Date of Discharge:  10/21/2024   Primary Care Physician: Provider, No Known  Consults       No orders found from 2024 to 10/17/2024.             Procedures:  10/17/2024 - Vaginal, Spontaneous     Admitting Diagnosis:  Normal labor [O80, Z37.9]    Discharge Diagnosis:  Same as Admitting plus:   Pregnancy at 39w5d - Delivered     Delivery Summary     OB Surgeon:  Eveline Dyer  Anesthesia: Epidural  Delivery Type:   Perineum: OBPERINEUM: Intact  Feeding method: Breast    Infant: female infant;    Weight: 2920 g (6 lb 7 oz)    APGARS: 8  @ 1 minute / 8  @ 5 minutes     Venous Blood Gas: No results found for: \"PHCVEN\", \"BECVEN\"   Arterial Blood Gas: No results found for: \"PHCART\", \"BECART\"     Hospital Course     Hospital Course:    Patient is a 19 y.o.  who at 39w5d had a Vaginal delivery birth.  Her postpartum course was without complications.    On PPD # 1 she was ready for discharge.    She had normal lochia and pain well controlled with oral medications    Day of Discharge     Vital Signs:       On the day of discharge POSTPARTUM pt tolerating a regular diet, ambulating, pain well controlled, urinating spontaneously and lochia appropriate.   Vital signs were stable and afebrile.  Exam was within normal limits.  Fundus was below umbilicus and nontender.  Meeting discharge criteria and desired discharge home.  Postpartum instructions and FU reviewed and questions answered.    Pertinent  and/or Most Recent Results     LAB RESULTS:   Lab Results   Component Value Date    WBC 18.15 (H) 10/17/2024    HGB 10.0 (L) 10/17/2024    HCT 29.6 (L) 10/17/2024     10/17/2024       Discharge Details        Discharge Medications        Continue These Medications        Instructions Start Date   albuterol sulfate  (90 Base) MCG/ACT inhaler  Commonly known as: " PROVENTIL HFA;VENTOLIN HFA;PROAIR HFA   2 puffs, Inhalation, Every 4 Hours PRN      famotidine 20 MG tablet  Commonly known as: Pepcid   20 mg, Oral, 2 Times Daily      ferrous sulfate 325 (65 FE) MG tablet   325 mg, Oral, Every Other Day      Prenatal 27-1 27-1 MG tablet tablet   1 tablet, Oral, Daily      PRENATAL GUMMIES PO   Take  by mouth.               Allergies   Allergen Reactions    Penicillins Rash    Robamox [Amoxicillin] Rash       Discharge Disposition:   Home, self-care    Discharge Condition:  Good    Follow Up:  Future Appointments   Date Time Provider Department Center   11/4/2024  1:45 PM Carol Hoover, DO [facility]       Electronically signed by Greta Zaldivar MD, 10/21/24, 1:35 PM EDT.

## 2024-10-28 ENCOUNTER — PATIENT OUTREACH (OUTPATIENT)
Dept: LABOR AND DELIVERY | Facility: HOSPITAL | Age: 19
End: 2024-10-28
Payer: COMMERCIAL

## 2024-10-28 NOTE — OUTREACH NOTE
Motherhood Connection    Postpartum EPDS sent via Karine Ayala RN  Maternity Nurse Navigator    10/28/2024, 11:58 EDT

## 2024-10-30 ENCOUNTER — PATIENT OUTREACH (OUTPATIENT)
Dept: LABOR AND DELIVERY | Facility: HOSPITAL | Age: 19
End: 2024-10-30
Payer: COMMERCIAL

## 2024-10-30 NOTE — OUTREACH NOTE
Motherhood Connection  Unable to Reach    Questions/Answers      Flowsheet Row Responses   Pending Outreach Postpartum Check-in   Call Attempt First   Outcome No answer/busy, Left message   Next Call Attempt Date 10/31/24            Shari Ayala RN  Maternity Nurse Navigator    10/30/2024, 12:10 EDT

## 2024-10-31 ENCOUNTER — PATIENT OUTREACH (OUTPATIENT)
Dept: LABOR AND DELIVERY | Facility: HOSPITAL | Age: 19
End: 2024-10-31
Payer: COMMERCIAL

## 2024-10-31 NOTE — OUTREACH NOTE
Motherhood Connection  Unable to Reach    Questions/Answers      Flowsheet Row Responses   Pending Outreach Postpartum Check-in   Call Attempt Second   Outcome No answer/busy, Left message   Next Call Attempt Date 11/01/24              Shari Ayala RN  Maternity Nurse Navigator    10/31/2024, 09:46 EDT

## 2024-11-01 ENCOUNTER — PATIENT OUTREACH (OUTPATIENT)
Dept: LABOR AND DELIVERY | Facility: HOSPITAL | Age: 19
End: 2024-11-01
Payer: COMMERCIAL

## 2024-11-04 ENCOUNTER — TELEPHONE (OUTPATIENT)
Dept: OBSTETRICS AND GYNECOLOGY | Facility: CLINIC | Age: 19
End: 2024-11-04

## 2024-11-04 NOTE — TELEPHONE ENCOUNTER
Caller:JAZMIN HEIMLICH    Relationship:  SELF    Best call back number:[phone]    PATIENT CALLED REQUESTING TO CANCEL SAME DAY APPT.    Did the patient call AFTER the start time of their scheduled appointment?  NO    Was the patient's appointment rescheduled?   NO- NONE AVAILABLE UNITL CAN- REQUESTING CALL BACK TO ANGEL

## 2024-11-08 ENCOUNTER — PATIENT OUTREACH (OUTPATIENT)
Dept: CALL CENTER | Facility: HOSPITAL | Age: 19
End: 2024-11-08
Payer: COMMERCIAL

## 2024-11-08 NOTE — OUTREACH NOTE
Motherhood Connection Survey      Flowsheet Row Responses   Synagogue facility patient discharged from? [facility]   Week 1 attempt successful? No   Unsuccessful attempts Attempt 2   Reschedule Tomorrow              Melany CARTER - Registered Nurse

## 2024-11-08 NOTE — OUTREACH NOTE
Motherhood Connection Survey      Flowsheet Row Responses   Buddhism facility patient discharged from? [facility]   Week 1 attempt successful? No   Unsuccessful attempts Attempt 1   Reschedule Today              Melany CARTER - Registered Nurse

## 2024-11-11 ENCOUNTER — PATIENT OUTREACH (OUTPATIENT)
Dept: CALL CENTER | Facility: HOSPITAL | Age: 19
End: 2024-11-11
Payer: COMMERCIAL

## 2024-11-11 ENCOUNTER — TELEPHONE (OUTPATIENT)
Dept: OBSTETRICS AND GYNECOLOGY | Facility: CLINIC | Age: 19
End: 2024-11-11
Payer: COMMERCIAL

## 2024-11-11 NOTE — OUTREACH NOTE
Motherhood Connection Survey      Flowsheet Row Responses   Gnosticist facility patient discharged from? [facility]   Week 1 attempt successful? No   Unsuccessful attempts Attempt 3              Melany CARTER - Registered Nurse

## 2024-11-22 ENCOUNTER — TELEMEDICINE (OUTPATIENT)
Dept: FAMILY MEDICINE CLINIC | Facility: TELEHEALTH | Age: 19
End: 2024-11-22
Payer: COMMERCIAL

## 2024-11-22 DIAGNOSIS — N61.0 MASTITIS, LEFT, ACUTE: Primary | ICD-10-CM

## 2024-11-22 RX ORDER — CEPHALEXIN 500 MG/1
500 CAPSULE ORAL 4 TIMES DAILY
Qty: 40 CAPSULE | Refills: 0 | Status: SHIPPED | OUTPATIENT
Start: 2024-11-22 | End: 2024-12-02

## 2024-11-22 NOTE — PROGRESS NOTES
"No chief complaint on file.      Video Visit Reason:   Free Text Description: I recently gave birth , and I have a lump in my left boob that hurts and it’s decent in size came out of no where about a week ago an had redness on top where it is I’ve been told by friends it’s a clogged duct, but I stopped pumping an a few days after is when this appeared an I heard you can get medication of some sort  Subjective   Jazmin Mae Heimlich is a 19 y.o. female.     History of Present Illness  Lump in the left breast after ceasing breastfeeding. Lump is about the size of a \"bouncy ball\" about the 1 o'clock position. It was firm initially about 5 days after she stopped pumping and was not tender. Then it became red, firm and painful. She did not breastfeed the baby, she had just been pumping the breast and feeding the baby breast milk in a bottle. She has not had a fever, chills, nipple discharge or streaking in the breast.      The following portions of the patient's history were reviewed and updated as appropriate: allergies, current medications, past medical history, and problem list.      Past Medical History:   Diagnosis Date    Anemia     Chlamydia infection during pregnancy     History of gestational hypertension 12/06/2021 12/6/2021 patient with elevated blood pressures around time of surgery.         Social History     Socioeconomic History    Marital status: Single    Highest education level: 11th grade   Tobacco Use    Smoking status: Never     Passive exposure: Yes    Smokeless tobacco: Never   Vaping Use    Vaping status: Former    Substances: Nicotine    Devices: Disposable   Substance and Sexual Activity    Alcohol use: Never    Drug use: Never    Sexual activity: Yes     Partners: Male     medication documentation: reviewed and updated with patient and   Current Outpatient Medications:     albuterol sulfate  (90 Base) MCG/ACT inhaler, Inhale 2 puffs Every 4 (Four) Hours As Needed for Wheezing., Disp: " 18 g, Rfl: 0    cephalexin (KEFLEX) 500 MG capsule, Take 1 capsule by mouth 4 (Four) Times a Day for 10 days., Disp: 40 capsule, Rfl: 0    ferrous sulfate 325 (65 FE) MG tablet, Take 1 tablet by mouth Every Other Day., Disp: 30 tablet, Rfl: 3    Prenatal MV & Min w/FA-DHA (PRENATAL GUMMIES PO), Take  by mouth., Disp: , Rfl:     Prenatal Vit-Fe Fumarate-FA (Prenatal 27-1) 27-1 MG tablet tablet, Take 1 tablet by mouth Daily., Disp: 90 tablet, Rfl: 1  Review of Systems   Constitutional:  Negative for chills and fever.   Skin:  Positive for color change (firm, red, sore area of left breast).       Objective   Physical Exam  Constitutional:       Appearance: She is not ill-appearing.   Pulmonary:      Effort: Pulmonary effort is normal.   Chest:   Breasts:     Left: Tenderness present. No nipple discharge.          Comments: Mildly erythematous with no streaking  Neurological:      Mental Status: She is alert.         Assessment & Plan   Diagnoses and all orders for this visit:    1. Mastitis, left, acute (Primary)  -     cephalexin (KEFLEX) 500 MG capsule; Take 1 capsule by mouth 4 (Four) Times a Day for 10 days.  Dispense: 40 capsule; Refill: 0                    Follow Up:  If your symptoms are not resolving by the completion of your treatment or are worsening, see your primary care provider for follow up. If you don't have a primary care provider, you may go to any Urgent Care for re-evaluation. If you develop any life threatening symptoms, go to the nearest Emergency Department immediately or call EMS.               The use of  Video Visit was utilized during this visit, using both [employer] and [employer]. The use of a video visit has been reviewed with the patient and verbal informed consent has been obtained. No technical difficulties occurred during the visit.    is located at [address] [place] [address]  Provider is located at [place], [place]

## 2024-11-22 NOTE — PATIENT INSTRUCTIONS
Follow up with OB/GYN or PCP if no improvement, worsening symptoms, elevated temperature or increased pain.    Mastitis    Mastitis is irritation and swelling (inflammation) in an area of the breast. This most often happens in females who are breastfeeding, but it can happen to anyone. This includes males. It will sometimes go away on its own. Other times, mastitis may need treatment.  What are the causes?  In people who are not breastfeeding, mastitis is often caused by germs (bacteria) that get into breast tissue. This can happen through cuts, cracks, or openings in the skin.  Other causes include:  Nipple piercing.  Some types of breast cancer.  What are the signs or symptoms?  Swelling, redness, tenderness, and pain in the breast. The area may also feel warm.  Swelling of the glands under the arm.  Fluid coming from the nipple.  Feeling tired (fatigue).  Headache and body aches.  Fever and chills.  Vomiting or feeling like you may vomit (nauseous).  Symptoms can last 2-5 days. The pain and redness are the worst on days 2 and 3. This will often go away by day 5. If an infection develops and is not treated, pus or fluid may form under the skin (abscess).  How is this diagnosed?  Mastitis is often diagnosed based on a physical exam and your symptoms. You may also have other tests, such as:  Blood tests.  Fluid tests. If fluid collects under the skin, it may be tested to find if germs are present.  Breast X-rays or ultrasounds.  How is this treated?  Treatment may include:  Using hot or cold compresses.  Pain medicine.  Antibiotic or steroid medicine.  Rest.  Drinking plenty of fluids.  Removing pus or fluid from under the skin.  Follow these instructions at home:  Breast care    Keep your nipples clean and dry.  If told, put ice on the affected area.  Put ice in a plastic bag.  Place a towel between your skin and the bag.  Leave the ice on for 20 minutes, 2-3 times a day.  If told, put heat on the affected area. Do  this as often as told by your doctor. Use the heat source that your doctor recommends, such as a moist heat pack or heating pad.  Place a towel between your skin and the heat source.  Leave the heat on for 20-30 minutes.  If your skin turns bright red, take off the ice or heat right away to prevent skin damage. The risk of damage is higher if you cannot feel pain, heat, or cold.  Medicines  Take over-the-counter and prescription medicines only as told by your doctor.  If you were prescribed antibiotics, take them as told by your doctor. Do not stop using them even if you start to feel better.  Contact a doctor if:  You have pus-like fluid leaking from the breast.  You have a fever.  Your pain and swelling get worse.  Your symptoms do not start to get better within 2 days of starting treatment.  Your pain is not helped by medicine.  Get help right away if:  You have a red line going from your breast toward your armpit.  This information is not intended to replace advice given to you by your health care provider. Make sure you discuss any questions you have with your health care provider.  Document Revised: 10/19/2023 Document Reviewed: 10/19/2023  Elsevier Patient Education © 2024 [employer]

## 2024-12-03 ENCOUNTER — HOSPITAL ENCOUNTER (EMERGENCY)
Facility: HOSPITAL | Age: 19
Discharge: HOME | End: 2024-12-03
Attending: EMERGENCY MEDICINE | Admitting: EMERGENCY MEDICINE
Payer: COMMERCIAL

## 2024-12-03 VITALS
WEIGHT: 192.24 LBS | TEMPERATURE: 98.5 F | HEIGHT: 65 IN | DIASTOLIC BLOOD PRESSURE: 73 MMHG | HEART RATE: 82 BPM | BODY MASS INDEX: 32.03 KG/M2 | SYSTOLIC BLOOD PRESSURE: 126 MMHG | RESPIRATION RATE: 18 BRPM | OXYGEN SATURATION: 100 %

## 2024-12-03 DIAGNOSIS — N61.1 BREAST ABSCESS: Primary | ICD-10-CM

## 2024-12-03 PROCEDURE — 87186 SC STD MICRODIL/AGAR DIL: CPT | Performed by: EMERGENCY MEDICINE

## 2024-12-03 PROCEDURE — 87205 SMEAR GRAM STAIN: CPT | Performed by: EMERGENCY MEDICINE

## 2024-12-03 PROCEDURE — 87147 CULTURE TYPE IMMUNOLOGIC: CPT | Performed by: EMERGENCY MEDICINE

## 2024-12-03 PROCEDURE — 87070 CULTURE OTHR SPECIMN AEROBIC: CPT | Performed by: EMERGENCY MEDICINE

## 2024-12-03 PROCEDURE — 25010000002 LIDOCAINE 1% - EPINEPHRINE 1:100000 1 %-1:100000 SOLUTION: Performed by: EMERGENCY MEDICINE

## 2024-12-03 PROCEDURE — 99283 EMERGENCY DEPT VISIT LOW MDM: CPT

## 2024-12-03 RX ORDER — HYDROCODONE BITARTRATE AND ACETAMINOPHEN 5; 325 MG/1; MG/1
1 TABLET ORAL ONCE AS NEEDED
Status: COMPLETED | OUTPATIENT
Start: 2024-12-03 | End: 2024-12-03

## 2024-12-03 RX ORDER — SULFAMETHOXAZOLE AND TRIMETHOPRIM 800; 160 MG/1; MG/1
1 TABLET ORAL 2 TIMES DAILY
Qty: 14 TABLET | Refills: 0 | Status: SHIPPED | OUTPATIENT
Start: 2024-12-03 | End: 2024-12-10

## 2024-12-03 RX ORDER — LIDOCAINE HYDROCHLORIDE AND EPINEPHRINE 10; 10 MG/ML; UG/ML
10 INJECTION, SOLUTION INFILTRATION; PERINEURAL ONCE
Status: COMPLETED | OUTPATIENT
Start: 2024-12-03 | End: 2024-12-03

## 2024-12-03 RX ORDER — HYDROCODONE BITARTRATE AND ACETAMINOPHEN 5; 325 MG/1; MG/1
1 TABLET ORAL 4 TIMES DAILY PRN
Qty: 12 TABLET | Refills: 0 | Status: SHIPPED | OUTPATIENT
Start: 2024-12-03

## 2024-12-03 RX ORDER — IBUPROFEN 400 MG/1
800 TABLET, FILM COATED ORAL ONCE
Status: COMPLETED | OUTPATIENT
Start: 2024-12-03 | End: 2024-12-03

## 2024-12-03 RX ADMIN — HYDROCODONE BITARTRATE AND ACETAMINOPHEN 1 TABLET: 5; 325 TABLET ORAL at 14:14

## 2024-12-03 RX ADMIN — LIDOCAINE HYDROCHLORIDE,EPINEPHRINE BITARTRATE 10 ML: 10; .01 INJECTION, SOLUTION INFILTRATION; PERINEURAL at 14:54

## 2024-12-03 RX ADMIN — IBUPROFEN 800 MG: 400 TABLET, FILM COATED ORAL at 13:44

## 2024-12-03 NOTE — ED PROVIDER NOTES
"Time: 1:03 PM EST  Date of encounter:  12/3/2024  Independent Historian/Clinical History and Information was obtained by:   Patient    History is limited by: N/A    Chief Complaint   Patient presents with    Breast Problem     \"Bump\" with pain on left breast         History of Present Illness:  Patient is a 19 y.o. year old female who presents to the emergency department for evaluation of left breast pain and tenderness.  Patient states that she has been breast-feeding but recently stopped and noticed that she had a \"bump\" on her left breast.  She states the area continues to grow so she was evaluated at urgent care.  She was prescribed cephalexin and encouraged to come to the ED for further evaluation if there was no improvement.  Patient states she has been taking the medicine and is not getting better.  She is no longer breast-feeding and/or pumping.  Denies fever or recent illness.  Denies nipple drainage or skin changes    Patient Care Team  Primary Care Provider: Provider, No Known    Past Medical History:     Allergies   Allergen Reactions    Penicillins Rash    Robamox [Amoxicillin] Rash     Past Medical History:   Diagnosis Date    Anemia     Chlamydia infection during pregnancy     History of gestational hypertension 12/06/2021 12/6/2021 patient with elevated blood pressures around time of surgery.         Past Surgical History:   Procedure Laterality Date    CHOLECYSTECTOMY N/A 12/05/2021    Procedure: CHOLECYSTECTOMY LAPAROSCOPIC;  Surgeon: Rasheed Morley MD;  Location: [facility] MAIN OR;  Service: General;  Laterality: N/A;     Family History   Problem Relation Age of Onset    Stroke Father     Mental illness Mother     Drug abuse Mother     Alcohol abuse Mother     COPD Maternal Grandmother     Hypertension Maternal Grandmother     Ovarian cancer Neg Hx     Uterine cancer Neg Hx     Breast cancer Neg Hx     Colon cancer Neg Hx     Prostatitis Neg Hx     Prostate cancer Neg Hx     Deep vein " "thrombosis Neg Hx     Pulmonary embolism Neg Hx        Home Medications:  Prior to Admission medications    Medication Sig Start Date End Date Taking? Authorizing Provider   albuterol sulfate  (90 Base) MCG/ACT inhaler Inhale 2 puffs Every 4 (Four) Hours As Needed for Wheezing. 9/18/23   Lizette De Anda MD   cephalexin (KEFLEX) 500 MG capsule Take 1 capsule by mouth 4 (Four) Times a Day for 10 days. 11/22/24 12/2/24  Kayli Burrsi APRN   ferrous sulfate 325 (65 FE) MG tablet Take 1 tablet by mouth Every Other Day. 6/20/24   Chuck Hidalgo MD   Prenatal MV & Min w/FA-DHA (PRENATAL GUMMIES PO) Take  by mouth.    Provider, MD Tri   Prenatal Vit-Fe Fumarate-FA (Prenatal 27-1) 27-1 MG tablet tablet Take 1 tablet by mouth Daily. 8/27/24   Ria Bernal DO        Social History:   Social History     Tobacco Use    Smoking status: Never     Passive exposure: Yes    Smokeless tobacco: Never   Vaping Use    Vaping status: Former    Substances: Nicotine    Devices: Disposable   Substance Use Topics    Alcohol use: Never    Drug use: Never         Review of Systems:  Review of Systems   Constitutional: Negative.    HENT: Negative.     Eyes: Negative.    Respiratory: Negative.     Cardiovascular: Negative.    Gastrointestinal: Negative.    Endocrine: Negative.    Genitourinary: Negative.    Musculoskeletal: Negative.    Skin:  Positive for color change and wound.   Allergic/Immunologic: Negative.    Neurological: Negative.    Hematological: Negative.    Psychiatric/Behavioral: Negative.          Physical Exam:  /73 (BP Location: Right arm, Patient Position: Lying)   Pulse 82   Temp 98.5 °F (36.9 °C) (Oral)   Resp 18   Ht 165.1 cm (65\")   Wt 87.2 kg (192 lb 3.9 oz)   LMP 01/13/2024 (Exact Date) Comment: PT STATES SHE IS PREGNANT PER HOMETEST  SpO2 100%   Breastfeeding No   BMI 31.99 kg/m²         Physical Exam  Vitals and nursing note reviewed.   Constitutional:       General: She is not " in acute distress.     Appearance: Normal appearance. She is not toxic-appearing.   HENT:      Head: Normocephalic and atraumatic.      Jaw: There is normal jaw occlusion.      Nose: Nose normal.      Mouth/Throat:      Mouth: Mucous membranes are moist.      Pharynx: Oropharynx is clear.   Eyes:      General: Lids are normal.      Extraocular Movements: Extraocular movements intact.      Conjunctiva/sclera: Conjunctivae normal.      Pupils: Pupils are equal, round, and reactive to light.   Cardiovascular:      Rate and Rhythm: Normal rate and regular rhythm.      Pulses: Normal pulses.      Heart sounds: Normal heart sounds.   Pulmonary:      Effort: Pulmonary effort is normal. No respiratory distress.      Breath sounds: Normal breath sounds. No wheezing or rhonchi.   Chest:   Breasts:     Left: Swelling, mass and tenderness present. No nipple discharge.          Comments: Erythematous mass noted in region as marked above.  Outside edge is firm with a inner area of mild fluctuance.  Using ultrasound abscess was confirmed.  Abdominal:      General: Abdomen is flat.      Palpations: Abdomen is soft.      Tenderness: There is no abdominal tenderness. There is no guarding or rebound.   Musculoskeletal:         General: Normal range of motion.      Cervical back: Normal range of motion and neck supple.      Right lower leg: No edema.      Left lower leg: No edema.   Lymphadenopathy:      Upper Body:      Left upper body: No supraclavicular, axillary or pectoral adenopathy.   Skin:     General: Skin is warm and dry.   Neurological:      Mental Status: She is alert and oriented to person, place, and time. Mental status is at baseline.   Psychiatric:         Mood and Affect: Mood normal.                 Procedures:  Incision & Drainage    Date/Time: 12/3/2024 3:25 PM    Performed by: Karlene Gomez APRN  Authorized by: Kavon Suarez MD    Consent:     Consent obtained:  Verbal    Consent given by:  Patient     "Risks discussed:  Bleeding, incomplete drainage and pain    Alternatives discussed:  No treatment  Universal protocol:     Patient identity confirmed:  Verbally with patient and hospital-assigned identification number  Location:     Type:  Abscess    Size:  5cm    Location: left breast.  Pre-procedure details:     Skin preparation:  Povidone-iodine  Sedation:     Sedation type:  None  Anesthesia:     Anesthesia method:  Local infiltration    Local anesthetic:  Lidocaine 1% WITH epi  Procedure type:     Complexity:  Simple  Procedure details:     Ultrasound guidance: yes      Incision types:  Single straight    Drainage:  Bloody and purulent    Drainage amount:  Copious    Wound treatment:  Wound left open    Packing materials:  1/4 in iodoform gauze    Amount 1/4\" iodoform:  2  Post-procedure details:     Procedure completion:  Tolerated well, no immediate complications        Medical Decision Making:      Comorbidities that affect care:    Anemia, gestational hypertension    External Notes reviewed:    Telephone Encounter: I reviewed patient's visit with telemedicine on eleven 22/24      The following orders were placed and all results were independently analyzed by me:  Orders Placed This Encounter   Procedures    Incision & Drainage    Wound Culture - Drainage, Breast, Left Central       Medications Given in the Emergency Department:  Medications   ibuprofen (ADVIL,MOTRIN) tablet 800 mg (800 mg Oral Given 12/3/24 1344)   HYDROcodone-acetaminophen (NORCO) 5-325 MG per tablet 1 tablet (1 tablet Oral Given 12/3/24 1414)   lidocaine 1% - EPINEPHrine 1:298126 (XYLOCAINE W/EPI) 1 %-1:152903 injection 10 mL (10 mL Injection Given 12/3/24 1454)        ED Course:    The patient was initially evaluated in the triage area where orders were placed. The patient was later dispositioned by SHANE Hernandez.      The patient was advised to stay for completion of workup which includes but is not limited to communication of " labs and radiological results, reassessment and plan. The patient was advised that leaving prior to disposition by a provider could result in critical findings that are not communicated to the patient.          Labs:    Lab Results (last 24 hours)       Procedure Component Value Units Date/Time    Wound Culture - Drainage, Breast, Left Central [[id number]] Collected: 12/03/24 1509    Specimen: Drainage from Breast, Left Central Updated: 12/03/24 1517             Imaging:    No Radiology Exams Resulted Within Past 24 Hours      Differential Diagnosis and Discussion:      Abscess: Differential diagnosis for an abscess includes but is not limited to bacterial or fungal infections, foreign body reactions, malignancies, and autoimmune or inflammatory conditions.    Ultrasound impression was interpreted by me.     MDM  Number of Diagnoses or Management Options  Breast abscess  Diagnosis management comments: The patient has erythema and pain consistent with abscess. The area of erythema was demarcated in the ED. The patient has fluctuance and induration consistent with abscess formation. The patient was started on additional antibiotics in the Emergency Department and instructed to complete the course of antibiotics she had already been prescribed. The patient is resting comfortably and feels better, is alert and in no distress.  I&D was performed in the emergency department with copious amounts of drainage obtained.  Wound culture was sent for testing.  On re-examination the patient does not appear toxic and has no meningeal signs, and there's no intractable vomiting or respiratory distress. Based on the history, exam, diagnostic testing and reassessment, the patient has no signs of sepsis.  The patient's vital signs have been stable. The patient's condition is stable and is appropriate for discharge. The patient will pursue further outpatient evaluation with the primary care physician or other designated or consultant  physician as indicated in the discharge instructions. The patient was counseled that a repeat examination within two days is imperative. This can be done as an outpatient. Patient advised to return to the ED if outpatient evaluation is not feasible. The patient was counseled to return to the ER sooner for worsening of erythema or spread outside of the demarcated lines. The patient was also counseled to return for worsening fever, chills, altered mental status, intractable vomiting or any other symptoms of concern.  Patient wound was packed and instructed to follow-up in 2 to 3 days to have wound recheck and packing removed if deemed necessary.  She was also told to return to the ED for signs of worsening of her condition.  Patient verbalizes understanding and is agreeable to plan of discharge.           Patient Care Considerations:    SEPSIS was considered but is NOT present in the emergency department as SIRS criteria is not present. LABS: I considered ordering labs, however patient exhibits no systemic symptoms of infection including she is afebrile, vital signs are stable, no signs of nausea or vomiting.      Consultants/Shared Management Plan:    SHARED VISIT: I have discussed the case with my supervising physician, Dr. Suarez who statespatient needs the abscess drained, packed, and additional medication prescribed. The substantive portion of the medical decision was made by the attesting physician who made or approve the management plan and will take responsibility for the patient.  Clinical findings were discussed and ultimate disposition was made in consult with supervising physician.    Social Determinants of Health:    Patient is independent, reliable, and has access to care.       Disposition and Care Coordination:    Discharged: The patient is suitable and stable for discharge with no need for consideration of admission.    I have explained the patient´s condition, diagnoses and treatment plan based on the  information available to me at this time. I have answered questions and addressed any concerns. The patient has a good  understanding of the patient´s diagnosis, condition, and treatment plan as can be expected at this point. The vital signs have been stable. The patient´s condition is stable and appropriate for discharge from the emergency department.      The patient will pursue further outpatient evaluation with the primary care physician or other designated or consulting physician as outlined in the discharge instructions. They are agreeable to this plan of care and follow-up instructions have been explained in detail. The patient has received these instructions in written format and has expressed an understanding of the discharge instructions. The patient is aware that any significant change in condition or worsening of symptoms should prompt an immediate return to this or the closest emergency department or call to 911.  I have explained discharge medications and the need for follow up with the patient/caretakers. This was also printed in the discharge instructions. Patient was discharged with the following medications and follow up:      Medication List        New Prescriptions      HYDROcodone-acetaminophen 5-325 MG per tablet  Commonly known as: NORCO  Take 1 tablet by mouth 4 (Four) Times a Day As Needed for Moderate Pain.     sulfamethoxazole-trimethoprim 800-160 MG per tablet  Commonly known as: BACTRIM DS,SEPTRA DS  Take 1 tablet by mouth 2 (Two) Times a Day for 7 days.            Stop      cephalexin 500 MG capsule  Commonly known as: KEFLEX               Where to Get Your Medications        These medications were sent to [id number] - [place], [place] - [address] AT [facility] OF [address]/[facility] & [place] - [phone] [facility] [phone]   [address] [place] [address] [place] [address]      Phone: [phone]   HYDROcodone-acetaminophen 5-325 MG per tablet  sulfamethoxazole-trimethoprim  800-160 MG per tablet      Provider, No Known  [facility]  [place] [address]    Call   To schedule an appointment in 2 to 3 days for wound reevaluation       Final diagnoses:   Breast abscess        ED Disposition       ED Disposition   Discharge    Condition   Stable    Comment   --               This medical record created using voice recognition software.             Karlene Gomez, APRN  12/03/24 7212

## 2024-12-03 NOTE — ED PROVIDER NOTES
"SHARED VISIT NOTE:    Patient is 19 y.o. year old female that presents to the ED for evaluation of left breast abscess and swelling and pain.     Physical Exam  Palpable area of fluctuance with surrounding induration and tenderness and warmth and erythema over left breast concerning for underlying abscess  ED Course:    /70 (BP Location: Right arm, Patient Position: Sitting)   Pulse 86   Temp 98.2 °F (36.8 °C) (Oral)   Resp 18   Ht 165.1 cm (65\")   Wt 87.2 kg (192 lb 3.9 oz)   LMP 01/13/2024 (Exact Date) Comment: PT STATES SHE IS PREGNANT PER HOMETEST  SpO2 99%   Breastfeeding No   BMI 31.99 kg/m²   Results for orders placed or performed during the hospital encounter of 10/16/24   Urine Drug Screen - Urine, Clean Catch    Collection Time: 10/16/24 11:48 PM    Specimen: Urine, Clean Catch   Result Value Ref Range    THC, Screen, Urine Negative Negative    Phencyclidine (PCP), Urine Negative Negative    Cocaine Screen, Urine Negative Negative    Methamphetamine, Ur Negative Negative    Opiate Screen Negative Negative    Amphetamine Screen, Urine Negative Negative    Benzodiazepine Screen, Urine Negative Negative    Tricyclic Antidepressants Screen Negative Negative    Methadone Screen, Urine Negative Negative    Barbiturates Screen, Urine Negative Negative    Oxycodone Screen, Urine Negative Negative    Buprenorphine, Screen, Urine Negative Negative   Fentanyl, Urine - Urine, Clean Catch    Collection Time: 10/16/24 11:48 PM    Specimen: Urine, Clean Catch   Result Value Ref Range    Fentanyl, Urine Negative Negative   Rapid Assay For ROM - Amniotic Fluid, Amniotic Sac    Collection Time: 10/16/24 11:51 PM    Specimen: Amniotic Sac; Amniotic Fluid   Result Value Ref Range    Rupture of Membranes Positive (A) Negative   CBC (No Diff)    Collection Time: 10/17/24 12:10 AM    Specimen: Blood   Result Value Ref Range    WBC 18.15 (H) 3.40 - 10.80 10*3/mm3    RBC 3.47 (L) 3.77 - 5.28 10*6/mm3    Hemoglobin " 10.0 (L) 12.0 - 15.9 g/dL    Hematocrit 29.6 (L) 34.0 - 46.6 %    MCV 85.3 79.0 - 97.0 fL    MCH 28.8 26.6 - 33.0 pg    MCHC 33.8 31.5 - 35.7 g/dL    RDW 14.3 12.3 - 15.4 %    RDW-SD 43.7 37.0 - 54.0 fl    MPV 9.6 6.0 - 12.0 fL    Platelets 214 140 - 450 10*3/mm3   Treponema pallidum AB w/Reflex RPR    Collection Time: 10/17/24 12:10 AM    Specimen: Blood   Result Value Ref Range    Treponemal AB Total Non-Reactive Non-Reactive   Type & Screen    Collection Time: 10/17/24 12:10 AM    Specimen: Blood   Result Value Ref Range    ABO Type A     RH type Negative     Antibody Screen Negative     T&S Expiration Date 10/20/2024 11:59:59 PM    Blood Gas, Venous, Cord    Collection Time: 10/17/24  3:03 AM    Specimen: Umbilical Cord; Cord Blood Venous   Result Value Ref Range    pH, Cord Venous 7.366 7.260 - 7.400 pH Units    pCO2, Cord Venous 38.3 35.0 - 51.3 mm Hg    pO2, Cord Venous 39.0 19.0 - 39.0 mm Hg    HCO3, Cord Venous 21.9 mmol/L    Base Excess, Cord Venous -3.1 -30.0 - 30.0 mmol/L    O2 Sat, Cord Venous 72.0 %    Barometric Pressure for Blood Gas 750.7000 mmHg   Blood Gas, Arterial, Cord    Collection Time: 10/17/24  3:03 AM    Specimen: Umbilical Cord; Cord Blood Arterial   Result Value Ref Range    pH, Cord Arterial 7.20 7.18 - 7.34 pH Units    pCO2, Cord Arterial 65.6 (H) 33.0 - 49.0 mmHg    pO2, Cord Arterial 15.5 mmHg    HCO3, Cord Arterial 25.7 mmol/L    Base Exc, Cord Arterial -3.8 (L) -2.0 - 2.0 mmol/L    O2 Sat, Cord Arterial 13.9 %    Barometric Pressure for Blood Gas 751.8000 mmHg   Tissue Pathology Exam    Collection Time: 10/17/24  3:29 AM    Specimen: Placenta; Tissue   Result Value Ref Range    Case Report       Surgical Pathology Report                         Case: [id number]                                  Authorizing Provider:  Eveline Dyer MD    Collected:           10/17/2024 03:29 AM          Ordering Location:     [facility]      Received:            10/18/2024 04:51 AM   "                               LABOR AND DELIVERY                                                           Pathologist:           Mabel Massey DO                                                       Specimen:    Placenta, Intrauterine pregnancy at 39w5d gestation who presents for: contractions                  and leaking fluid                                                                          Clinical Information       Intrauterine pregnancy at 39w5d gestation who presents for: contractions and leaking fluid       Final Diagnosis       Placenta, delivery:   - Weight: 484 g   - Umbilical cord: Three vessels, umbilical phlebitis   - Fetal membranes: Acute inflammation present    - Fetal surface: Unremarkable   - Subchorionic fibrin: No inflammation present   - Placental villi: No villitis present, compatible with third trimester gestation   - Maternal surface: Unremarkable   - Maternal vasculature: Unremarkable   - Other findings: None          Gross Description       1. Placenta.  Received in formalin and labeled \"placenta\" is a 484 g, 20.0 x 15.0 x 3.0 cm peña placenta with attached membranes and umbilical cord. The 3.5 cm in length by 1.0 cm in average diameter, white, trivascular umbilical cord is paracentrally inserted 5.0 cm from the nearest disc edge. The tan, wrinkled, opaque membranes are 50% marginally inserted, 50% circummarginally inserted, with a point of rupture 2.0 cm of the nearest disc edge. The blue-gray fetal surface displays a normal, arborizing distribution of blood vessels with a moderate amount of subchorionic fibrin deposition. The red-brown maternal surface is complete and unremarkable. Sectioning reveals dark red, spongy parenchyma with no distinct lesions. Representative sections are submitted in 3 cassettes as follows:  1A: Umbilical cord and membrane roll  1B-1C: Full-thickness sections.   LAURIE      Microscopic Description       Microscopic examination performed.   "   Postpartum RhIg Evaluation    Collection Time: 10/17/24 12:36 PM    Specimen: Arm, Right; Blood   Result Value Ref Range    ABO Type A     RH type Negative     Antibody Screen Negative    Fetal Bleed Screen    Collection Time: 10/17/24 12:36 PM    Specimen: Arm, Right; Blood   Result Value Ref Range    Fetal Bleed Screen Negative    Doses of Rh Immune Globulin    Collection Time: 10/17/24 12:36 PM    Specimen: Arm, Right; Blood   Result Value Ref Range    Number of Doses Fetal Screen Negative - Recommend 1 vial of RhIg (A) RhIg is not Indicated, RhIg is not indicated due to the patient's Rh status     Medications   ibuprofen (ADVIL,MOTRIN) tablet 800 mg (has no administration in time range)   HYDROcodone-acetaminophen (NORCO) 5-325 MG per tablet 1 tablet (has no administration in time range)   lidocaine 1% - EPINEPHrine 1:686826 (XYLOCAINE W/EPI) 1 %-1:170082 injection 10 mL (has no administration in time range)     No results found.    MDM:    Procedures        I performed a bedside skin and soft tissue ultrasound of the left breast revealing a fairly large fluid collection concerning for breast abscess just superior to the left nipple.  We will plan to perform incision and drainage after she is medicated for pain.                    SHARED VISIT ATTESTATION:    This visit was performed by both myself and an APC.  I performed the substantive portion of the medical decision making. The management plan was made or approved by me, and I take responsibility for patient management.           Kavon Suarez MD  13:30 EST  12/03/24         Kavon Suarez MD  12/03/24 2373

## 2024-12-03 NOTE — DISCHARGE INSTRUCTIONS
Home.  I have sent a prescription for Bactrim to your pharmacy.  Please  and take as directed, completing the entire course.  Take along with the cephalexin you have already been prescribed.  Keep your wound clean and dry.  Avoid touching the area or pulling on the packing.  You may alternate Tylenol and ibuprofen for pain and fever relief.    Call your primary care provider to schedule an appointment for wound reevaluation in 2 to 3 days.    If symptoms worsen, change in presentation, or you develop new symptoms please seek medical attention or return to the ED for further evaluation.

## 2024-12-06 LAB
BACTERIA SPEC AEROBE CULT: ABNORMAL
GRAM STN SPEC: ABNORMAL
GRAM STN SPEC: ABNORMAL

## 2024-12-08 ENCOUNTER — HOSPITAL ENCOUNTER (EMERGENCY)
Facility: HOSPITAL | Age: 19
Discharge: HOME | End: 2024-12-08
Attending: EMERGENCY MEDICINE | Admitting: EMERGENCY MEDICINE
Payer: COMMERCIAL

## 2024-12-08 ENCOUNTER — APPOINTMENT (OUTPATIENT)
Dept: GENERAL RADIOLOGY | Facility: HOSPITAL | Age: 19
End: 2024-12-08
Payer: COMMERCIAL

## 2024-12-08 VITALS
OXYGEN SATURATION: 98 % | HEIGHT: 65 IN | TEMPERATURE: 98.4 F | SYSTOLIC BLOOD PRESSURE: 99 MMHG | RESPIRATION RATE: 16 BRPM | HEART RATE: 101 BPM | WEIGHT: 185.63 LBS | BODY MASS INDEX: 30.93 KG/M2 | DIASTOLIC BLOOD PRESSURE: 58 MMHG

## 2024-12-08 DIAGNOSIS — Z51.89 ENCOUNTER FOR WOUND RE-CHECK: Primary | ICD-10-CM

## 2024-12-08 PROCEDURE — 99283 EMERGENCY DEPT VISIT LOW MDM: CPT

## 2024-12-08 PROCEDURE — 71046 X-RAY EXAM CHEST 2 VIEWS: CPT

## 2024-12-08 NOTE — ED PROVIDER NOTES
Time: 1:19 PM EST  Date of encounter:  12/8/2024  Room number: 56/56  Independent Historian/Clinical History and Information was obtained by:   Patient    History is limited by: N/A    Chief Complaint: Wound evaluation      History of Present Illness:  Patient is a 19 y.o. year old female who presents to the emergency department for evaluation of healing wound to left breast.  Patient reports having an abscess to her left breast that was recently I&D.  Packing was placed.  She states her son however pulled out some of the packing but she feels that pieces pain inside the wound.  She has had no fevers, nausea, vomiting, or diarrhea and does not describe any drainage or indications of cellulitis.    HPI    Patient Care Team  Primary Care Provider: Provider, No Known    Past Medical History:     Allergies   Allergen Reactions    Penicillins Rash    Robamox [Amoxicillin] Rash     Past Medical History:   Diagnosis Date    Anemia     Chlamydia infection during pregnancy     History of gestational hypertension 12/06/2021 12/6/2021 patient with elevated blood pressures around time of surgery.         Past Surgical History:   Procedure Laterality Date    CHOLECYSTECTOMY N/A 12/05/2021    Procedure: CHOLECYSTECTOMY LAPAROSCOPIC;  Surgeon: Rasheed Morley MD;  Location: [facility] OR;  Service: General;  Laterality: N/A;     Family History   Problem Relation Age of Onset    Stroke Father     Mental illness Mother     Drug abuse Mother     Alcohol abuse Mother     COPD Maternal Grandmother     Hypertension Maternal Grandmother     Ovarian cancer Neg Hx     Uterine cancer Neg Hx     Breast cancer Neg Hx     Colon cancer Neg Hx     Prostatitis Neg Hx     Prostate cancer Neg Hx     Deep vein thrombosis Neg Hx     Pulmonary embolism Neg Hx        Home Medications:  Prior to Admission medications    Medication Sig Start Date End Date Taking? Authorizing Provider   albuterol sulfate  (90 Base) MCG/ACT inhaler Inhale 2  "puffs Every 4 (Four) Hours As Needed for Wheezing. 9/18/23   Lizette De Anda MD   ferrous sulfate 325 (65 FE) MG tablet Take 1 tablet by mouth Every Other Day. 6/20/24   Chuck Hidalgo MD   HYDROcodone-acetaminophen (NORCO) 5-325 MG per tablet Take 1 tablet by mouth 4 (Four) Times a Day As Needed for Moderate Pain. 12/3/24   Kavon Suarez MD   Prenatal MV & Min w/FA-DHA (PRENATAL GUMMIES PO) Take  by mouth.    ProviderTri MD   Prenatal Vit-Fe Fumarate-FA (Prenatal 27-1) 27-1 MG tablet tablet Take 1 tablet by mouth Daily. 8/27/24   Ria Bernal DO   sulfamethoxazole-trimethoprim (BACTRIM DS,SEPTRA DS) 800-160 MG per tablet Take 1 tablet by mouth 2 (Two) Times a Day for 7 days. 12/3/24 12/10/24  Karlene Gomez, APRN        Social History:   Social History     Tobacco Use    Smoking status: Never     Passive exposure: Yes    Smokeless tobacco: Never   Vaping Use    Vaping status: Former    Substances: Nicotine    Devices: Disposable   Substance Use Topics    Alcohol use: Never    Drug use: Never         Review of Systems:  Review of Systems     I performed a review of systems today, which was all negative, except for the positives found in the HPI above.    Physical Exam:  BP 99/58   Pulse 101   Temp 98.4 °F (36.9 °C)   Resp 16   Ht 165.1 cm (65\")   Wt 84.2 kg (185 lb 10 oz)   LMP 01/13/2024 (Exact Date) Comment: PT STATES SHE IS PREGNANT PER HOMETEST  SpO2 98%   Breastfeeding Unknown   BMI 30.89 kg/m²     Physical Exam - chaperone present during exam  General:  Awake alert no apparent distress    Head: Normocephalic, atraumatic, eyes PERRLA EOMI, nose normal, oropharynx normal    Neck: Supple, no meningismus, no cervical lymphadenopathy or JVD    Heart: Regular rate and rhythm, no murmurs or rubs, 2+ radial pulses    Lungs: Clear to auscultation bilaterally, no wheezing or rhonchi or rales, no respiratory distress    Abdomen: Soft, nontender, nondistended, no signs of " peritonitis    Skin: Warm, dry, no rash, small healing incision to left breast.  Vision is approximately 1 cm in length.  Wound is slightly open but appears to be healing appropriately.  There is no noted drainage, no redness, and no warmth to the area.  There is also no obvious signs of mastitis.  Is minimal tenderness on exam.    Musculoskeletal: Normal range of motion, no obvious deformities, no edema noted    Neurologic: Awake, alert, oriented x 3, no motor or sensory deficits appreciated    Psych: No suicidal or homicidal ideations, no psychosis          Procedures:  Procedures      Medical Decision Making:      Comorbidities that affect care:        External Notes reviewed:          The following orders were placed and all results were independently analyzed by me:  Orders Placed This Encounter   Procedures    XR Chest 2 View       Medications Given in the Emergency Department:  Medications - No data to display     ED Course:    ED Course as of 12/08/24 1658   Sun Dec 08, 2024   1447 I discussed x-ray findings with ER attending Dr. Rosas [MS]      ED Course User Index  [MS] Chyna Grimm APRN       Labs:    Lab Results (last 24 hours)       ** No results found for the last 24 hours. **             Imaging:    XR Chest 2 View    Result Date: 12/8/2024  XR CHEST 2 VW Date of Exam: 12/8/2024 1:37 PM EST Indication: r/o retained packing in left breast Comparison: None available. Findings: There is subtle linear radiodensities over the breast on the lateral view (marked with arrows), not well visualized on the frontal view. Lungs are adequately expanded and appear clear. Cardiomediastinal contours are within normal limits.     Impression: Subtle linear radiodensities over the breast on the lateral view, which could be due to external artifact or retained foreign body. Recommend correlation with type of packing material used. Electronically Signed: Sakshi Sam  12/8/2024 2:15 PM EST  Workstation ID:  [id number]       Differential Diagnosis and Discussion:    Wound Evaluation: Differential diagnosis includes but is not limited to laceration, abrasion, puncture, burn, ulcer, cellulitis, abscess, vasculitis, malignancy, and rash.        [facility]                 Patient Care Considerations:    ANTIBIOTICS: I considered prescribing antibiotics as an outpatient however no bacterial focus of infection was found.      Consultants/Shared Management Plan:    I discussed x-ray findings with ER attending Dr. Rosas.    Social Determinants of Health:    Patient is independent, reliable, and has access to care.       Disposition and Care Coordination:    Discharged: The patient is suitable and stable for discharge with no need for consideration of admission.    I have explained the patient´s condition, diagnoses and treatment plan based on the information available to me at this time. I have answered questions and addressed any concerns. The patient has a good  understanding of the patient´s diagnosis, condition, and treatment plan as can be expected at this point. The vital signs have been stable. The patient´s condition is stable and appropriate for discharge from the emergency department.      The patient will pursue further outpatient evaluation with the primary care physician or other designated or consulting physician as outlined in the discharge instructions. They are agreeable to this plan of care and follow-up instructions have been explained in detail. The patient has received these instructions in written format and has expressed an understanding of the discharge instructions. The patient is aware that any significant change in condition or worsening of symptoms should prompt an immediate return to this or the closest emergency department or call to 911.    Final diagnoses:   Encounter for wound re-check        ED Disposition       ED Disposition   Discharge    Condition   Stable    Comment   --               This medical  record created using voice recognition software.       Chyna Grimm, APRN  12/08/24 0685

## 2024-12-08 NOTE — DISCHARGE INSTRUCTIONS
Your incision appears to be healing nicely.  There are no current signs of infection.  Your x-ray did not show any retained foreign body including retained packing from your recent procedure.  You will however need to continue to monitor it for any signs of infection such as redness, warmth, drainage, or a fever.  Please continue to take your antibiotics and clean your wound as you have been follow-up with your primary care provider in 5 to 7 days to be reevaluated.

## 2025-03-05 ENCOUNTER — HOSPITAL ENCOUNTER (EMERGENCY)
Facility: HOSPITAL | Age: 20
Discharge: HOME | End: 2025-03-05
Attending: EMERGENCY MEDICINE
Payer: COMMERCIAL

## 2025-03-05 ENCOUNTER — APPOINTMENT (OUTPATIENT)
Dept: GENERAL RADIOLOGY | Facility: HOSPITAL | Age: 20
End: 2025-03-05
Payer: COMMERCIAL

## 2025-03-05 VITALS
BODY MASS INDEX: 30.52 KG/M2 | OXYGEN SATURATION: 100 % | HEIGHT: 65 IN | WEIGHT: 183.2 LBS | RESPIRATION RATE: 18 BRPM | HEART RATE: 107 BPM | TEMPERATURE: 99.3 F | DIASTOLIC BLOOD PRESSURE: 68 MMHG | SYSTOLIC BLOOD PRESSURE: 120 MMHG

## 2025-03-05 DIAGNOSIS — S02.2XXA CLOSED FRACTURE OF NASAL BONE, INITIAL ENCOUNTER: Primary | ICD-10-CM

## 2025-03-05 PROCEDURE — 70160 X-RAY EXAM OF NASAL BONES: CPT

## 2025-03-05 PROCEDURE — 99283 EMERGENCY DEPT VISIT LOW MDM: CPT

## 2025-03-05 NOTE — ED PROVIDER NOTES
Time: 2:32 PM EST  Date of encounter:  3/5/2025  Independent Historian/Clinical History and Information was obtained by:   Patient    History is limited by: N/A    Chief Complaint: nose pain       History of Present Illness:  Patient is a 20 y.o. year old female who presents to the emergency department for evaluation of nose pain that began today after tripping and falling while chasing her child and landing on her face.  Patient denies LOC.      Patient Care Team  Primary Care Provider: Provider, No Known    Past Medical History:     Allergies   Allergen Reactions    Penicillins Rash    Robamox [Amoxicillin] Rash     Past Medical History:   Diagnosis Date    Anemia     Chlamydia infection during pregnancy     History of gestational hypertension 12/06/2021 12/6/2021 patient with elevated blood pressures around time of surgery.         Past Surgical History:   Procedure Laterality Date    CHOLECYSTECTOMY N/A 12/05/2021    Procedure: CHOLECYSTECTOMY LAPAROSCOPIC;  Surgeon: Rasheed Morley MD;  Location: [facility] MAIN OR;  Service: General;  Laterality: N/A;     Family History   Problem Relation Age of Onset    Stroke Father     Mental illness Mother     Drug abuse Mother     Alcohol abuse Mother     COPD Maternal Grandmother     Hypertension Maternal Grandmother     Ovarian cancer Neg Hx     Uterine cancer Neg Hx     Breast cancer Neg Hx     Colon cancer Neg Hx     Prostatitis Neg Hx     Prostate cancer Neg Hx     Deep vein thrombosis Neg Hx     Pulmonary embolism Neg Hx        Home Medications:  Prior to Admission medications    Medication Sig Start Date End Date Taking? Authorizing Provider   albuterol sulfate  (90 Base) MCG/ACT inhaler Inhale 2 puffs Every 4 (Four) Hours As Needed for Wheezing. 9/18/23   Lizette De Anda MD   ferrous sulfate 325 (65 FE) MG tablet Take 1 tablet by mouth Every Other Day. 6/20/24   Chuck Hidalgo MD   HYDROcodone-acetaminophen (NORCO) 5-325 MG per tablet Take 1  "tablet by mouth 4 (Four) Times a Day As Needed for Moderate Pain. 12/3/24   Kavon Suarez MD   Prenatal MV & Min w/FA-DHA (PRENATAL GUMMIES PO) Take  by mouth.    Provider, MD Tri   Prenatal Vit-Fe Fumarate-FA (Prenatal 27-1) 27-1 MG tablet tablet Take 1 tablet by mouth Daily. 8/27/24   Ria Bernal DO        Social History:   Social History     Tobacco Use    Smoking status: Never     Passive exposure: Yes    Smokeless tobacco: Never   Vaping Use    Vaping status: Former    Substances: Nicotine    Devices: Disposable   Substance Use Topics    Alcohol use: Never    Drug use: Never         Review of Systems:  Review of Systems   Constitutional:  Negative for chills and fever.   HENT:  Positive for sinus pain. Negative for congestion, rhinorrhea and sore throat.    Eyes:  Negative for pain and visual disturbance.   Respiratory:  Negative for apnea, cough, chest tightness and shortness of breath.    Cardiovascular:  Negative for chest pain and palpitations.   Gastrointestinal:  Negative for abdominal pain, diarrhea, nausea and vomiting.   Genitourinary:  Negative for difficulty urinating and dysuria.   Musculoskeletal:  Negative for joint swelling and myalgias.   Skin:  Positive for color change.   Neurological:  Negative for seizures and headaches.   Psychiatric/Behavioral: Negative.     All other systems reviewed and are negative.       Physical Exam:  /68 (BP Location: Left arm, Patient Position: Sitting)   Pulse 107   Temp 99.3 °F (37.4 °C) (Oral)   Resp 18   Ht 165.1 cm (65\")   Wt 83.1 kg (183 lb 3.2 oz)   LMP 02/06/2025 (Exact Date)   SpO2 100%   Breastfeeding No   BMI 30.49 kg/m²     Physical Exam  Vitals and nursing note reviewed.   Constitutional:       General: She is not in acute distress.     Appearance: Normal appearance. She is not toxic-appearing.   HENT:      Head: Normocephalic and atraumatic.      Jaw: There is normal jaw occlusion.   Eyes:      General: Lids are normal.     "  Extraocular Movements: Extraocular movements intact.      Conjunctiva/sclera: Conjunctivae normal.      Pupils: Pupils are equal, round, and reactive to light.   Cardiovascular:      Rate and Rhythm: Normal rate and regular rhythm.      Pulses: Normal pulses.      Heart sounds: Normal heart sounds.   Pulmonary:      Effort: Pulmonary effort is normal. No respiratory distress.      Breath sounds: Normal breath sounds. No wheezing or rhonchi.   Abdominal:      General: Abdomen is flat.      Palpations: Abdomen is soft.      Tenderness: There is no abdominal tenderness. There is no guarding or rebound.   Musculoskeletal:         General: Normal range of motion.      Cervical back: Normal range of motion and neck supple.      Right lower leg: No edema.      Left lower leg: No edema.   Skin:     General: Skin is warm and dry.      Findings: Bruising (Present to nose) present.   Neurological:      Mental Status: She is alert and oriented to person, place, and time. Mental status is at baseline.   Psychiatric:         Mood and Affect: Mood normal.                    Medical Decision Making:      Comorbidities that affect care:    None    External Notes reviewed:          The following orders were placed and all results were independently analyzed by me:  Orders Placed This Encounter   Procedures    XR Nasal Bones    Ambulatory Referral to ENT (Otolaryngology)       Medications Given in the Emergency Department:  Medications - No data to display     ED Course:         Labs:    Lab Results (last 24 hours)       ** No results found for the last 24 hours. **             Imaging:    XR Nasal Bones    Result Date: 3/5/2025  XR NASAL BONES Date of Exam: 3/5/2025 1:36 PM EST Indication: fall, trauma Comparison: None available. Findings: Metallic rings, piercings noted. Lucencies through the nasal bones suggest nondisplaced fractures. There appears to be mild overlying soft tissue swelling. No air-fluid levels noted in the  visualized sinuses.     Impression: There appear to be nondisplaced nasal bone fractures with overlying soft tissue swelling Electronically Signed: Ravi Cobian MD  3/5/2025 2:07 PM EST  Workstation ID: [id number]       Differential Diagnosis and Discussion:    Wound Evaluation: Differential diagnosis includes but is not limited to laceration, abrasion, puncture, burn, ulcer, cellulitis, abscess, vasculitis, malignancy, and rash.    PROCEDURES:    X-ray were performed in the emergency department and all X-ray impressions were independently interpreted by me.    No orders to display       Procedures    MDM     X-ray facial bones show nondisplaced nasal bone fractures with overlying soft tissue swelling.  Patient has no respiratory compromise.  I instructed patient to return.  To ED if she develops any new or worsening symptoms.  Otherwise follow-up with ENT.  Patient states she understands and agrees with plan of care.                Patient Care Considerations:          Consultants/Shared Management Plan:    None    Social Determinants of Health:    Patient is independent, reliable, and has access to care.       Disposition and Care Coordination:    Discharged: The patient is suitable and stable for discharge with no need for consideration of admission.    I have explained the patient´s condition, diagnoses and treatment plan based on the information available to me at this time. I have answered questions and addressed any concerns. The patient has a good  understanding of the patient´s diagnosis, condition, and treatment plan as can be expected at this point. The vital signs have been stable. The patient´s condition is stable and appropriate for discharge from the emergency department.      The patient will pursue further outpatient evaluation with the primary care physician or other designated or consulting physician as outlined in the discharge instructions. They are agreeable to this plan of care and follow-up  instructions have been explained in detail. The patient has received these instructions in written format and has expressed an understanding of the discharge instructions. The patient is aware that any significant change in condition or worsening of symptoms should prompt an immediate return to this or the closest emergency department or call to 911.  I have explained discharge medications and the need for follow up with the patient/caretakers. This was also printed in the discharge instructions. Patient was discharged with the following medications and follow up:      Medication List      No changes were made to your prescriptions during this visit.      Jagdeep Simpson MD  [address] [address]  [phone]    Call in 1 day  to schedule follow up       Final diagnoses:   Closed fracture of nasal bone, initial encounter        ED Disposition       ED Disposition   Discharge    Condition   Stable    Comment   --               This medical record created using voice recognition software.             Jeremias Dangelo PA-C  03/05/25 1449

## 2025-06-03 NOTE — OUTREACH NOTE
[facility]  Unable to Reach    Questions/Answers      Flowsheet Row Responses   Pending Outreach Postpartum Check-in   Call Attempt Third   Outcome No answer/busy, Left message, nanoRETEhart message sent to patient              Shari Ayala RN  Maternity Nurse Navigator    11/1/2024, 14:11 EDT     As you may have been given sedative drugs and medications, you should not drive or operate heavy machinery the next 24 hours. You should avoid any heavy lifting, straining or running today. To the extent possible, defer any important decisions for the next 24 hours.

## 2025-08-17 ENCOUNTER — HOSPITAL ENCOUNTER (EMERGENCY)
Facility: HOSPITAL | Age: 20
Discharge: LEFT BEFORE DISCHARGE | End: 2025-08-17
Attending: EMERGENCY MEDICINE
Payer: COMMERCIAL

## 2025-08-17 PROCEDURE — 99211 OFF/OP EST MAY X REQ PHY/QHP: CPT | Performed by: EMERGENCY MEDICINE

## (undated) DEVICE — 2, DISPOSABLE SUCTION/IRRIGATOR WITHOUT DISPOSABLE TIP: Brand: STRYKEFLOW

## (undated) DEVICE — ENDOPATH XCEL WITH OPTIVIEW TECHNOLOGY UNIVERSAL TROCAR STABILITY SLEEVES: Brand: ENDOPATH XCEL OPTIVIEW

## (undated) DEVICE — ENDOPATH XCEL WITH OPTIVIEW TECHNOLOGY BLADELESS TROCARS WITH STABILITY SLEEVES: Brand: ENDOPATH XCEL OPTIVIEW

## (undated) DEVICE — GLV SURG SENSICARE SLT PF LF 6.5 STRL

## (undated) DEVICE — ENDOPATH ETS-FLEX45 ARTICULATING ENDOSCOPIC LINEAR CUTTER, NO RELOAD: Brand: ENDOPATH

## (undated) DEVICE — ANTIBACTERIAL UNDYED BRAIDED (POLYGLACTIN 910), SYNTHETIC ABSORBABLE SUTURE: Brand: COATED VICRYL

## (undated) DEVICE — TISSUE RETRIEVAL SYSTEM: Brand: INZII RETRIEVAL SYSTEM

## (undated) DEVICE — LAPAROSCOPIC SCISSORS: Brand: EPIX LAPAROSCOPIC SCISSORS

## (undated) DEVICE — ENDOPATH PNEUMONEEDLE INSUFFLATION NEEDLES WITH LUER LOCK CONNECTORS 120MM: Brand: ENDOPATH

## (undated) DEVICE — STERILE POLYISOPRENE POWDER-FREE SURGICAL GLOVES WITH EMOLLIENT COATING: Brand: PROTEXIS

## (undated) DEVICE — PENCL E/S SMOKEEVAC W/TELESCP CANN

## (undated) DEVICE — GENERAL LAPAROSCOPY-LF: Brand: MEDLINE INDUSTRIES, INC.

## (undated) DEVICE — ADHS SKIN SURG TISS VISC PREMIERPRO EXOFIN HI/VISC FAST/DRY

## (undated) DEVICE — VISUALIZATION SYSTEM: Brand: CLEARIFY

## (undated) DEVICE — SUT VIC PLS CTD BR 0 TIE 18IN VIL

## (undated) DEVICE — 3 RING SUTURE PASSER - 16 CM: Brand: 3 RING SUTURE PASSER - 16 CM

## (undated) DEVICE — SLV SCD LEG COMFORT KENDALLSCD MD REPROC

## (undated) DEVICE — INTENDED FOR TISSUE SEPARATION, AND OTHER PROCEDURES THAT REQUIRE A SHARP SURGICAL BLADE TO PUNCTURE OR CUT.: Brand: BARD-PARKER ® CARBON RIB-BACK BLADES

## (undated) DEVICE — GOWN,REINFRCE,POLY,SIRUS,BREATH SLV,XXLG: Brand: MEDLINE

## (undated) DEVICE — GLV SURG SENSICARE SLT PF LF 7.5 STRL